# Patient Record
Sex: MALE | Race: AMERICAN INDIAN OR ALASKA NATIVE | NOT HISPANIC OR LATINO | Employment: FULL TIME | URBAN - METROPOLITAN AREA
[De-identification: names, ages, dates, MRNs, and addresses within clinical notes are randomized per-mention and may not be internally consistent; named-entity substitution may affect disease eponyms.]

---

## 2021-05-14 PROBLEM — K21.9 GERD (GASTROESOPHAGEAL REFLUX DISEASE): Status: ACTIVE | Noted: 2021-05-14

## 2021-08-13 DIAGNOSIS — J98.6 ELEVATED DIAPHRAGM: Primary | ICD-10-CM

## 2022-04-25 ENCOUNTER — TELEPHONE (OUTPATIENT)
Dept: CARDIOTHORACIC SURGERY | Facility: CLINIC | Age: 52
End: 2022-04-25
Payer: COMMERCIAL

## 2022-04-25 NOTE — PROGRESS NOTES
History & Physical    SUBJECTIVE:     History of Present Illness:  51 year old male with PMH of HTN, WM on CPAP and GERD presents for evaluation of elevated left alex-diaphragm. Reports dyspnea with little exertion that has been worked up by Pulmonology and Cardiology for about a year. He reports he notices dyspnea at night after he takes a shower, when lying flat and when walking.  He walks on his breaks at work and his coworker commented on how heavy he was breathing. COVID positive in April 2020. No history of cervical spine or cardiac surgeries or procedures. He does have diffuse spondylotic changes in his cervical spine resulting in multilevel mild spinal canal narrowing. Report intermittent left upper extremity neuropathy, numbness and tingling.     PSH of appendectomy, hemorrhoidectomy, tonsillectomy and adenoidectomy. No significant trauma history.  Never smoker. Denies EtOH. Works in an office.     Chief Complaint   Patient presents with    Consult       Review of patient's allergies indicates:  No Known Allergies    Current Outpatient Medications   Medication Sig Dispense Refill    lisinopriL-hydrochlorothiazide (PRINZIDE,ZESTORETIC) 20-12.5 mg per tablet Take 1 tablet by mouth once daily.      meloxicam (MOBIC) 15 MG tablet Take 15 mg by mouth once daily.      pantoprazole (PROTONIX) 40 MG tablet Take 40 mg by mouth 2 (two) times daily. Take prior to breakfast and supper.       No current facility-administered medications for this visit.     Facility-Administered Medications Ordered in Other Visits   Medication Dose Route Frequency Provider Last Rate Last Admin    0.9%  NaCl infusion   Intravenous Continuous Yuriy Wilburn MD   Stopped at 05/14/21 1047       Past Medical History:   Diagnosis Date    Hypertension     Internal hemorrhoids     Restless leg syndrome      Past Surgical History:   Procedure Laterality Date    APPENDECTOMY      ARTHROSCOPY OF KNEE Left      "ESOPHAGOGASTRODUODENOSCOPY N/A 5/14/2021    Procedure: EGD (ESOPHAGOGASTRODUODENOSCOPY);  Surgeon: Yuriy Wilburn MD;  Location: [facility];  Service: Endoscopy;  Laterality: N/A;    HEMORRHOID SURGERY      TONSILLECTOMY      addenoidectomy    TYMPANOSTOMY TUBE PLACEMENT       Family History   Problem Relation Age of Onset    Hypertension Father      Social History     Tobacco Use    Smoking status: Never Smoker    Smokeless tobacco: Never Used   Substance Use Topics    Alcohol use: Never    Drug use: Never        Review of Systems:  Review of Systems   Constitutional: Negative for appetite change, fatigue and fever.   HENT: Negative for trouble swallowing and voice change.    Respiratory: Positive for shortness of breath and wheezing. Negative for cough.    Gastrointestinal: Negative for abdominal pain, diarrhea and vomiting.   Musculoskeletal: Positive for arthralgias (LUE neuropathy) and neck pain. Negative for myalgias.   Neurological: Negative for weakness, light-headedness, numbness and headaches.   Psychiatric/Behavioral: Negative for confusion.       OBJECTIVE:     Vital Signs (Most Recent)  Pulse: (!) 57 (04/27/22 0851)  BP: 114/71 (04/27/22 0851)  SpO2: 96 % (04/27/22 0851)  6' 1" (1.854 m)  119.8 kg (264 lb 1.8 oz)   Body mass index is 34.85 kg/m².    Physical Exam:  Physical Exam  Constitutional:       Appearance: Normal appearance.   Cardiovascular:      Rate and Rhythm: Normal rate and regular rhythm.      Pulses: Normal pulses.   Pulmonary:      Effort: Pulmonary effort is normal.      Breath sounds: Wheezing (Expiratory wheeze) present.   Abdominal:      General: Abdomen is flat.      Palpations: Abdomen is soft.   Musculoskeletal:      Right lower leg: No edema.      Left lower leg: No edema.   Neurological:      General: No focal deficit present.      Mental Status: He is alert and oriented to person, place, and time.   Psychiatric:         Mood and Affect: Mood normal.       Diagnostic " Results:    6/25/21- CT Chest without Contrast:  The central airways are patent and clear.  Subsegmental atelectasis versus scarring in the bilateral lower lobes.  Mild chronic elevation bilateral hemidiaphragm.  No pleural or pericardial fluid.  No aortic aneurysm.  No hilar or mediastinal lymph node enlargement.  Visualized portion of the upper abdomen appears unremarkable.  No axillary lymph node enlargement.  No osseous abnormality.    6/25/21- PFTs:  FEV1- 2.7 63%  DLCO- 27 75%    7/15/21- 2D ECHO:      11/5/21- CT Cervical Spine:  1. Diffuse spondylotic change resulting in multilevel mild spinal canal narrowing and varying degrees of mild to severe foraminal narrowing with individual levels detailed above.  2. Reversal of the normal cervical curvature may be secondary to positioning or spasm    4/27/22- SNIFF: Elevated left hemidiaphragm with paradoxical upward motion, suggesting phrenic nerve palsy.    ASSESSMENT/PLAN:     51 year old male with PMH of HTN, WM on CPAP and GERD presents for evaluation of elevated left alex-diaphragm.    PLAN:Plan   Long discussion with patient regarding the indications, risks and benefits of left hemidiaphragm plication. He would like to proceed with left robot-assisted hemidiaphragm plication.   Appropriate patient education regarding the swapna-operative period as well as intraoperative details were discussed. Risks, including but not limited to, bleeding, infection, pain and anesthetic complication were discussed. Patient was given the opportunity to ask questions and to have those questions answered to their satisfaction. Patient verbalized understanding to both procedure and associated risks. Consent was obtained.

## 2022-04-25 NOTE — TELEPHONE ENCOUNTER
----- Message from Earnest Cruz sent at 4/25/2022  8:38 AM CDT -----  Regarding: sched an appt      The Pt's wife would like to be seen for the appt the Pt missed 9/10/2022 for - Diaphragm elevation.     # [phone] (Pt's wife states the Pt is at work and won't be able to recv your call)

## 2022-04-25 NOTE — TELEPHONE ENCOUNTER
Called and spoke to patient's wife. Imaging and appt rescheduled for this Wednesday 4/27. Given directions to radiology and clinic location. Patient to call back with any additional questions. Patient's wife verbalized understanding.

## 2022-04-27 ENCOUNTER — HOSPITAL ENCOUNTER (OUTPATIENT)
Dept: RADIOLOGY | Facility: HOSPITAL | Age: 52
Discharge: HOME | End: 2022-04-27
Attending: PHYSICIAN ASSISTANT
Payer: COMMERCIAL

## 2022-04-27 ENCOUNTER — OFFICE VISIT (OUTPATIENT)
Dept: CARDIOTHORACIC SURGERY | Facility: CLINIC | Age: 52
End: 2022-04-27
Payer: COMMERCIAL

## 2022-04-27 VITALS
OXYGEN SATURATION: 96 % | BODY MASS INDEX: 35.01 KG/M2 | HEART RATE: 57 BPM | DIASTOLIC BLOOD PRESSURE: 71 MMHG | WEIGHT: 264.13 LBS | SYSTOLIC BLOOD PRESSURE: 114 MMHG | HEIGHT: 73 IN

## 2022-04-27 DIAGNOSIS — J98.6 ELEVATED DIAPHRAGM: Primary | ICD-10-CM

## 2022-04-27 DIAGNOSIS — J98.6 ELEVATED DIAPHRAGM: ICD-10-CM

## 2022-04-27 DIAGNOSIS — J98.6 DIAPHRAGMATIC PARALYSIS: ICD-10-CM

## 2022-04-27 PROCEDURE — 76000 FLUOROSCOPY <1 HR PHYS/QHP: CPT | Mod: TC

## 2022-04-27 PROCEDURE — 76000 FL FLUORO OF DIAPHRAGM: ICD-10-PCS | Mod: 26,,, | Performed by: RADIOLOGY

## 2022-04-27 PROCEDURE — 3074F PR MOST RECENT SYSTOLIC BLOOD PRESSURE < 130 MM HG: ICD-10-PCS | Mod: CPTII,S$GLB,, | Performed by: THORACIC SURGERY (CARDIOTHORACIC VASCULAR SURGERY)

## 2022-04-27 PROCEDURE — 99205 PR OFFICE/OUTPT VISIT, NEW, LEVL V, 60-74 MIN: ICD-10-PCS | Mod: S$GLB,,, | Performed by: THORACIC SURGERY (CARDIOTHORACIC VASCULAR SURGERY)

## 2022-04-27 PROCEDURE — 3078F PR MOST RECENT DIASTOLIC BLOOD PRESSURE < 80 MM HG: ICD-10-PCS | Mod: CPTII,S$GLB,, | Performed by: THORACIC SURGERY (CARDIOTHORACIC VASCULAR SURGERY)

## 2022-04-27 PROCEDURE — 76000 FLUOROSCOPY <1 HR PHYS/QHP: CPT | Mod: 26,,, | Performed by: RADIOLOGY

## 2022-04-27 PROCEDURE — 3078F DIAST BP <80 MM HG: CPT | Mod: CPTII,S$GLB,, | Performed by: THORACIC SURGERY (CARDIOTHORACIC VASCULAR SURGERY)

## 2022-04-27 PROCEDURE — 99999 PR PBB SHADOW E&M-EST. PATIENT-LVL III: ICD-10-PCS | Mod: PBBFAC,,, | Performed by: THORACIC SURGERY (CARDIOTHORACIC VASCULAR SURGERY)

## 2022-04-27 PROCEDURE — 99999 PR PBB SHADOW E&M-EST. PATIENT-LVL III: CPT | Mod: PBBFAC,,, | Performed by: THORACIC SURGERY (CARDIOTHORACIC VASCULAR SURGERY)

## 2022-04-27 PROCEDURE — 3074F SYST BP LT 130 MM HG: CPT | Mod: CPTII,S$GLB,, | Performed by: THORACIC SURGERY (CARDIOTHORACIC VASCULAR SURGERY)

## 2022-04-27 PROCEDURE — 3008F PR BODY MASS INDEX (BMI) DOCUMENTED: ICD-10-PCS | Mod: CPTII,S$GLB,, | Performed by: THORACIC SURGERY (CARDIOTHORACIC VASCULAR SURGERY)

## 2022-04-27 PROCEDURE — 4010F ACE/ARB THERAPY RXD/TAKEN: CPT | Mod: CPTII,S$GLB,, | Performed by: THORACIC SURGERY (CARDIOTHORACIC VASCULAR SURGERY)

## 2022-04-27 PROCEDURE — 3008F BODY MASS INDEX DOCD: CPT | Mod: CPTII,S$GLB,, | Performed by: THORACIC SURGERY (CARDIOTHORACIC VASCULAR SURGERY)

## 2022-04-27 PROCEDURE — 4010F PR ACE/ARB THEARPY RXD/TAKEN: ICD-10-PCS | Mod: CPTII,S$GLB,, | Performed by: THORACIC SURGERY (CARDIOTHORACIC VASCULAR SURGERY)

## 2022-04-27 PROCEDURE — 99205 OFFICE O/P NEW HI 60 MIN: CPT | Mod: S$GLB,,, | Performed by: THORACIC SURGERY (CARDIOTHORACIC VASCULAR SURGERY)

## 2022-04-27 RX ORDER — LISINOPRIL AND HYDROCHLOROTHIAZIDE 12.5; 2 MG/1; MG/1
1 TABLET ORAL DAILY
COMMUNITY
Start: 2022-03-22 | End: 2024-02-23 | Stop reason: DRUGHIGH

## 2022-05-25 ENCOUNTER — TELEPHONE (OUTPATIENT)
Dept: CARDIOTHORACIC SURGERY | Facility: CLINIC | Age: 52
End: 2022-05-25
Payer: COMMERCIAL

## 2022-05-25 ENCOUNTER — PATIENT MESSAGE (OUTPATIENT)
Dept: CARDIOTHORACIC SURGERY | Facility: CLINIC | Age: 52
End: 2022-05-25
Payer: COMMERCIAL

## 2022-05-25 NOTE — TELEPHONE ENCOUNTER
----- Message from Matias Gaytan sent at 5/24/2022  9:22 AM CDT -----  Contact: patient  Patient calling in regards to canceling procedure. Requesting call back.      Patient @[phone]

## 2022-05-25 NOTE — TELEPHONE ENCOUNTER
Called patient again regarding canceling or rescheduling procedure. Patient voicemail is not set up and full on the alternate number. Unable to leave a message.

## 2022-05-25 NOTE — TELEPHONE ENCOUNTER
Called again this morning regarding message about canceling his procedure. Patient does not have voicemail set up and unable to leave a message.

## 2022-05-26 ENCOUNTER — TELEPHONE (OUTPATIENT)
Dept: CARDIOTHORACIC SURGERY | Facility: HOSPITAL | Age: 52
End: 2022-05-26
Payer: COMMERCIAL

## 2022-05-26 NOTE — TELEPHONE ENCOUNTER
Patient called to cancel his surgery. He has been having more issues with neck pain and upper extremity neuropathies related to cervical spine disease. He'd like to address this prior to having surgery on his diaphragm. He is scheduled to see a neurosurgeon in August. His breathing is about the same. He gets SOB with minimal exertion. I will cancel his surgery for June 13th. He will call to reschedule once his spine issues have been addressed. We can forgo another appointment and just schedule him for surgery.

## 2023-07-11 DIAGNOSIS — J98.6 ELEVATED DIAPHRAGM: Primary | ICD-10-CM

## 2023-07-21 ENCOUNTER — HOSPITAL ENCOUNTER (OUTPATIENT)
Dept: RADIOLOGY | Facility: HOSPITAL | Age: 53
Discharge: HOME | End: 2023-07-21
Attending: PHYSICIAN ASSISTANT
Payer: COMMERCIAL

## 2023-07-21 DIAGNOSIS — J98.6 ELEVATED DIAPHRAGM: ICD-10-CM

## 2023-07-21 PROCEDURE — 76000 FL FLUORO OF DIAPHRAGM: ICD-10-PCS | Mod: 26,,, | Performed by: RADIOLOGY

## 2023-07-21 PROCEDURE — 76000 FLUOROSCOPY <1 HR PHYS/QHP: CPT | Mod: 26,,, | Performed by: RADIOLOGY

## 2023-07-21 PROCEDURE — 76000 FLUOROSCOPY <1 HR PHYS/QHP: CPT | Mod: TC

## 2023-08-04 ENCOUNTER — OFFICE VISIT (OUTPATIENT)
Dept: CARDIOTHORACIC SURGERY | Facility: CLINIC | Age: 53
End: 2023-08-04
Payer: COMMERCIAL

## 2023-08-04 ENCOUNTER — LAB VISIT (OUTPATIENT)
Dept: LAB | Facility: HOSPITAL | Age: 53
End: 2023-08-04
Attending: PHYSICIAN ASSISTANT
Payer: COMMERCIAL

## 2023-08-04 VITALS
WEIGHT: 261.25 LBS | DIASTOLIC BLOOD PRESSURE: 90 MMHG | BODY MASS INDEX: 34.62 KG/M2 | SYSTOLIC BLOOD PRESSURE: 141 MMHG | HEIGHT: 73 IN | OXYGEN SATURATION: 97 % | HEART RATE: 61 BPM

## 2023-08-04 DIAGNOSIS — J98.6 ELEVATED DIAPHRAGM: ICD-10-CM

## 2023-08-04 DIAGNOSIS — Z01.810 PRE-OPERATIVE CARDIOVASCULAR EXAMINATION: ICD-10-CM

## 2023-08-04 DIAGNOSIS — J98.6 ELEVATED DIAPHRAGM: Primary | ICD-10-CM

## 2023-08-04 DIAGNOSIS — J98.6 DIAPHRAGMATIC PARALYSIS: ICD-10-CM

## 2023-08-04 LAB
ALBUMIN SERPL BCP-MCNC: 3.9 G/DL (ref 3.5–5.2)
ALP SERPL-CCNC: 98 U/L (ref 55–135)
ALT SERPL W/O P-5'-P-CCNC: 28 U/L (ref 10–44)
ANION GAP SERPL CALC-SCNC: 11 MMOL/L (ref 8–16)
APTT PPP: 26.5 SEC (ref 21–32)
AST SERPL-CCNC: 25 U/L (ref 10–40)
BASOPHILS # BLD AUTO: 0.07 K/UL (ref 0–0.2)
BASOPHILS NFR BLD: 1 % (ref 0–1.9)
BILIRUB SERPL-MCNC: 0.5 MG/DL (ref 0.1–1)
BUN SERPL-MCNC: 8 MG/DL (ref 6–20)
CALCIUM SERPL-MCNC: 8.9 MG/DL (ref 8.7–10.5)
CHLORIDE SERPL-SCNC: 111 MMOL/L (ref 95–110)
CO2 SERPL-SCNC: 20 MMOL/L (ref 23–29)
CREAT SERPL-MCNC: 1 MG/DL (ref 0.5–1.4)
DIFFERENTIAL METHOD: ABNORMAL
EOSINOPHIL # BLD AUTO: 0.2 K/UL (ref 0–0.5)
EOSINOPHIL NFR BLD: 2.1 % (ref 0–8)
ERYTHROCYTE [DISTWIDTH] IN BLOOD BY AUTOMATED COUNT: 13.4 % (ref 11.5–14.5)
EST. GFR  (NO RACE VARIABLE): >60 ML/MIN/1.73 M^2
GLUCOSE SERPL-MCNC: 95 MG/DL (ref 70–110)
HCT VFR BLD AUTO: 45 % (ref 40–54)
HGB BLD-MCNC: 14.1 G/DL (ref 14–18)
IMM GRANULOCYTES # BLD AUTO: 0.03 K/UL (ref 0–0.04)
IMM GRANULOCYTES NFR BLD AUTO: 0.4 % (ref 0–0.5)
INR PPP: 1 (ref 0.8–1.2)
LYMPHOCYTES # BLD AUTO: 2.3 K/UL (ref 1–4.8)
LYMPHOCYTES NFR BLD: 32.5 % (ref 18–48)
MCH RBC QN AUTO: 25.3 PG (ref 27–31)
MCHC RBC AUTO-ENTMCNC: 31.3 G/DL (ref 32–36)
MCV RBC AUTO: 81 FL (ref 82–98)
MONOCYTES # BLD AUTO: 0.8 K/UL (ref 0.3–1)
MONOCYTES NFR BLD: 10.5 % (ref 4–15)
NEUTROPHILS # BLD AUTO: 3.8 K/UL (ref 1.8–7.7)
NEUTROPHILS NFR BLD: 53.5 % (ref 38–73)
NRBC BLD-RTO: 0 /100 WBC
PLATELET # BLD AUTO: 269 K/UL (ref 150–450)
PMV BLD AUTO: 11.1 FL (ref 9.2–12.9)
POTASSIUM SERPL-SCNC: 4.2 MMOL/L (ref 3.5–5.1)
PREALB SERPL-MCNC: 19 MG/DL (ref 20–43)
PROT SERPL-MCNC: 6.8 G/DL (ref 6–8.4)
PROTHROMBIN TIME: 10.5 SEC (ref 9–12.5)
RBC # BLD AUTO: 5.57 M/UL (ref 4.6–6.2)
SODIUM SERPL-SCNC: 142 MMOL/L (ref 136–145)
WBC # BLD AUTO: 7.16 K/UL (ref 3.9–12.7)

## 2023-08-04 PROCEDURE — 3080F DIAST BP >= 90 MM HG: CPT | Mod: CPTII,S$GLB,, | Performed by: THORACIC SURGERY (CARDIOTHORACIC VASCULAR SURGERY)

## 2023-08-04 PROCEDURE — 3080F PR MOST RECENT DIASTOLIC BLOOD PRESSURE >= 90 MM HG: ICD-10-PCS | Mod: CPTII,S$GLB,, | Performed by: THORACIC SURGERY (CARDIOTHORACIC VASCULAR SURGERY)

## 2023-08-04 PROCEDURE — 80053 COMPREHEN METABOLIC PANEL: CPT | Performed by: PHYSICIAN ASSISTANT

## 2023-08-04 PROCEDURE — 85610 PROTHROMBIN TIME: CPT | Performed by: PHYSICIAN ASSISTANT

## 2023-08-04 PROCEDURE — 99999 PR PBB SHADOW E&M-EST. PATIENT-LVL IV: CPT | Mod: PBBFAC,,, | Performed by: THORACIC SURGERY (CARDIOTHORACIC VASCULAR SURGERY)

## 2023-08-04 PROCEDURE — 4010F PR ACE/ARB THEARPY RXD/TAKEN: ICD-10-PCS | Mod: CPTII,S$GLB,, | Performed by: THORACIC SURGERY (CARDIOTHORACIC VASCULAR SURGERY)

## 2023-08-04 PROCEDURE — 3008F PR BODY MASS INDEX (BMI) DOCUMENTED: ICD-10-PCS | Mod: CPTII,S$GLB,, | Performed by: THORACIC SURGERY (CARDIOTHORACIC VASCULAR SURGERY)

## 2023-08-04 PROCEDURE — 1159F MED LIST DOCD IN RCRD: CPT | Mod: CPTII,S$GLB,, | Performed by: THORACIC SURGERY (CARDIOTHORACIC VASCULAR SURGERY)

## 2023-08-04 PROCEDURE — 1159F PR MEDICATION LIST DOCUMENTED IN MEDICAL RECORD: ICD-10-PCS | Mod: CPTII,S$GLB,, | Performed by: THORACIC SURGERY (CARDIOTHORACIC VASCULAR SURGERY)

## 2023-08-04 PROCEDURE — 3008F BODY MASS INDEX DOCD: CPT | Mod: CPTII,S$GLB,, | Performed by: THORACIC SURGERY (CARDIOTHORACIC VASCULAR SURGERY)

## 2023-08-04 PROCEDURE — 3077F SYST BP >= 140 MM HG: CPT | Mod: CPTII,S$GLB,, | Performed by: THORACIC SURGERY (CARDIOTHORACIC VASCULAR SURGERY)

## 2023-08-04 PROCEDURE — 3077F PR MOST RECENT SYSTOLIC BLOOD PRESSURE >= 140 MM HG: ICD-10-PCS | Mod: CPTII,S$GLB,, | Performed by: THORACIC SURGERY (CARDIOTHORACIC VASCULAR SURGERY)

## 2023-08-04 PROCEDURE — 85730 THROMBOPLASTIN TIME PARTIAL: CPT | Performed by: PHYSICIAN ASSISTANT

## 2023-08-04 PROCEDURE — 99213 OFFICE O/P EST LOW 20 MIN: CPT | Mod: S$GLB,,, | Performed by: THORACIC SURGERY (CARDIOTHORACIC VASCULAR SURGERY)

## 2023-08-04 PROCEDURE — 99999 PR PBB SHADOW E&M-EST. PATIENT-LVL IV: ICD-10-PCS | Mod: PBBFAC,,, | Performed by: THORACIC SURGERY (CARDIOTHORACIC VASCULAR SURGERY)

## 2023-08-04 PROCEDURE — 36415 COLL VENOUS BLD VENIPUNCTURE: CPT | Performed by: PHYSICIAN ASSISTANT

## 2023-08-04 PROCEDURE — 4010F ACE/ARB THERAPY RXD/TAKEN: CPT | Mod: CPTII,S$GLB,, | Performed by: THORACIC SURGERY (CARDIOTHORACIC VASCULAR SURGERY)

## 2023-08-04 PROCEDURE — 85025 COMPLETE CBC W/AUTO DIFF WBC: CPT | Performed by: PHYSICIAN ASSISTANT

## 2023-08-04 PROCEDURE — 99213 PR OFFICE/OUTPT VISIT, EST, LEVL III, 20-29 MIN: ICD-10-PCS | Mod: S$GLB,,, | Performed by: THORACIC SURGERY (CARDIOTHORACIC VASCULAR SURGERY)

## 2023-08-04 PROCEDURE — 84134 ASSAY OF PREALBUMIN: CPT | Performed by: PHYSICIAN ASSISTANT

## 2023-09-01 ENCOUNTER — HOSPITAL ENCOUNTER (OUTPATIENT)
Dept: CARDIOLOGY | Facility: HOSPITAL | Age: 53
Discharge: HOME | End: 2023-09-01
Attending: THORACIC SURGERY (CARDIOTHORACIC VASCULAR SURGERY)
Payer: COMMERCIAL

## 2023-09-01 VITALS
BODY MASS INDEX: 34.59 KG/M2 | HEART RATE: 70 BPM | WEIGHT: 261 LBS | HEIGHT: 73 IN | DIASTOLIC BLOOD PRESSURE: 90 MMHG | SYSTOLIC BLOOD PRESSURE: 140 MMHG

## 2023-09-01 DIAGNOSIS — Z01.810 PRE-OPERATIVE CARDIOVASCULAR EXAMINATION: ICD-10-CM

## 2023-09-01 LAB
AV INDEX (PROSTH): 0.84
AV MEAN GRADIENT: 6 MMHG
AV PEAK GRADIENT: 12 MMHG
AV VALVE AREA BY VELOCITY RATIO: 2.36 CM²
AV VALVE AREA: 2.6 CM²
AV VELOCITY RATIO: 0.77
BSA FOR ECHO PROCEDURE: 2.47 M2
CV ECHO LV RWT: 0.3 CM
DOP CALC AO PEAK VEL: 1.76 M/S
DOP CALC AO VTI: 36.88 CM
DOP CALC LVOT AREA: 3.1 CM2
DOP CALC LVOT DIAMETER: 1.98 CM
DOP CALC LVOT PEAK VEL: 1.35 M/S
DOP CALC LVOT STROKE VOLUME: 95.9 CM3
DOP CALCLVOT PEAK VEL VTI: 31.16 CM
E WAVE DECELERATION TIME: 201.02 MSEC
E/A RATIO: 1.29
E/E' RATIO: 7.5 M/S
ECHO LV POSTERIOR WALL: 0.8 CM (ref 0.6–1.1)
FRACTIONAL SHORTENING: 30 % (ref 28–44)
INTERVENTRICULAR SEPTUM: 0.79 CM (ref 0.6–1.1)
IVRT: 79.92 MSEC
LA MAJOR: 5.04 CM
LA MINOR: 5.01 CM
LA WIDTH: 4.14 CM
LEFT ATRIUM SIZE: 4.04 CM
LEFT ATRIUM VOLUME INDEX MOD: 28 ML/M2
LEFT ATRIUM VOLUME INDEX: 29.6 ML/M2
LEFT ATRIUM VOLUME MOD: 67.58 CM3
LEFT ATRIUM VOLUME: 71.44 CM3
LEFT INTERNAL DIMENSION IN SYSTOLE: 3.68 CM (ref 2.1–4)
LEFT VENTRICLE DIASTOLIC VOLUME INDEX: 55.15 ML/M2
LEFT VENTRICLE DIASTOLIC VOLUME: 132.91 ML
LEFT VENTRICLE MASS INDEX: 61 G/M2
LEFT VENTRICLE SYSTOLIC VOLUME INDEX: 23.9 ML/M2
LEFT VENTRICLE SYSTOLIC VOLUME: 57.49 ML
LEFT VENTRICULAR INTERNAL DIMENSION IN DIASTOLE: 5.26 CM (ref 3.5–6)
LEFT VENTRICULAR MASS: 146.94 G
LV LATERAL E/E' RATIO: 7.5 M/S
LV SEPTAL E/E' RATIO: 7.5 M/S
MV A" WAVE DURATION": 18.27 MSEC
MV PEAK A VEL: 0.7 M/S
MV PEAK E VEL: 0.9 M/S
MV STENOSIS PRESSURE HALF TIME: 58.3 MS
MV VALVE AREA P 1/2 METHOD: 3.77 CM2
PISA TR MAX VEL: 2.15 M/S
PULM VEIN S/D RATIO: 0.4
PV PEAK D VEL: 0.48 M/S
PV PEAK S VEL: 0.19 M/S
RA MAJOR: 4.34 CM
RA PRESSURE ESTIMATED: 3 MMHG
RA WIDTH: 3.8 CM
RIGHT VENTRICULAR END-DIASTOLIC DIMENSION: 4 CM
RV TB RVSP: 5 MMHG
SINUS: 3.3 CM
STJ: 2.6 CM
TDI LATERAL: 0.12 M/S
TDI SEPTAL: 0.12 M/S
TDI: 0.12 M/S
TR MAX PG: 18 MMHG
TRICUSPID ANNULAR PLANE SYSTOLIC EXCURSION: 2.89 CM
TV REST PULMONARY ARTERY PRESSURE: 21 MMHG
Z-SCORE OF LEFT VENTRICULAR DIMENSION IN END DIASTOLE: -7.54
Z-SCORE OF LEFT VENTRICULAR DIMENSION IN END SYSTOLE: -4.72

## 2023-09-01 PROCEDURE — 93306 TTE W/DOPPLER COMPLETE: CPT | Mod: 26,,, | Performed by: INTERNAL MEDICINE

## 2023-09-01 PROCEDURE — 93306 TTE W/DOPPLER COMPLETE: CPT

## 2023-09-01 PROCEDURE — 93306 ECHO (CUPID ONLY): ICD-10-PCS | Mod: 26,,, | Performed by: INTERNAL MEDICINE

## 2023-09-13 ENCOUNTER — ANESTHESIA EVENT (OUTPATIENT)
Dept: SURGERY | Facility: HOSPITAL | Age: 53
DRG: 165 | End: 2023-09-13
Payer: COMMERCIAL

## 2023-09-13 ENCOUNTER — TELEPHONE (OUTPATIENT)
Dept: CARDIOTHORACIC SURGERY | Facility: CLINIC | Age: 53
End: 2023-09-13
Payer: COMMERCIAL

## 2023-09-13 NOTE — ANESTHESIA PREPROCEDURE EVALUATION
[facility]  Anesthesia Pre-Operative Evaluation         Patient Name: Michele Chi Jr.  YOB: 1970  MRN: [id number]    SUBJECTIVE:     Pre-operative evaluation for Procedure(s) (LRB):  XI ROBOTIC PLICATION, DIAPHRAGM (Left)     09/13/2023    Michele Chi Jr. is a 53 y.o. male w/ a significant PMHx of HTN, WM on CPAP, GERD, and c4-c7 fusion. Presented with elevated left hemidiaphragm. He complains of CHAVEZ and orthopnea.     Patient now presents for the above procedure(s).      LDA: None documented.     Prev airway: None documented.    Drips: None documented.    Patient Active Problem List   Diagnosis    GERD (gastroesophageal reflux disease)       Review of patient's allergies indicates:  No Known Allergies    Current Inpatient Medications:      Current Facility-Administered Medications on File Prior to Encounter   Medication Dose Route Frequency Provider Last Rate Last Admin    0.9%  NaCl infusion   Intravenous Continuous Yuriy Wilburn MD   Stopped at 05/14/21 1047     Current Outpatient Medications on File Prior to Encounter   Medication Sig Dispense Refill    lisinopriL-hydrochlorothiazide (PRINZIDE,ZESTORETIC) 20-12.5 mg per tablet Take 1 tablet by mouth once daily.      pantoprazole (PROTONIX) 40 MG tablet Take 40 mg by mouth daily as needed. Take prior to breakfast and supper.         Past Surgical History:   Procedure Laterality Date    APPENDECTOMY      ARTHROSCOPY OF KNEE Left     CERVICAL FUSION      COLONOSCOPY N/A 8/30/2023    Procedure: COLONOSCOPY;  Surgeon: Yuriy Wilburn MD;  Location: [facility];  Service: Endoscopy;  Laterality: N/A;    ESOPHAGOGASTRODUODENOSCOPY N/A 05/14/2021    Procedure: EGD (ESOPHAGOGASTRODUODENOSCOPY);  Surgeon: Yuriy Wilburn MD;  Location: [facility];  Service: Endoscopy;  Laterality: N/A;    HEMORRHOID SURGERY      TONSILLECTOMY      addenoidectomy    TYMPANOSTOMY TUBE PLACEMENT         Social  History     Socioeconomic History    Marital status:    Tobacco Use    Smoking status: Never    Smokeless tobacco: Never   Substance and Sexual Activity    Alcohol use: Never    Drug use: Never    Sexual activity: Yes       OBJECTIVE:     Vital Signs Range (Last 24H):         Significant Labs:  Lab Results   Component Value Date    WBC 7.16 08/04/2023    HGB 14.1 08/04/2023    HCT 45.0 08/04/2023     08/04/2023    ALT 28 08/04/2023    AST 25 08/04/2023     08/04/2023    K 4.2 08/04/2023     (H) 08/04/2023    CREATININE 1.0 08/04/2023    BUN 8 08/04/2023    CO2 20 (L) 08/04/2023    INR 1.0 08/04/2023       Diagnostic Studies: No relevant studies.    EKG:   Results for orders placed or performed during the hospital encounter of 09/23/22   EKG 12-lead    Collection Time: 09/23/22 11:21 AM    Narrative    Test Reason : Z01.810,    Vent. Rate : 072 BPM     Atrial Rate : 072 BPM     P-R Int : 168 ms          QRS Dur : 084 ms      QT Int : 384 ms       P-R-T Axes : 062 061 068 degrees     QTc Int : 420 ms    Normal sinus rhythm  Normal ECG  When compared with ECG of 08-MAY-2021 19:52,  No significant change was found  Confirmed by Damon Ford MD ([id number]) on 9/23/2022 4:41:15 PM    Referred By: MARY MAURICIO           Confirmed By:Damon Ford MD       2D ECHO:  TTE:  No results found for this or any previous visit.    ELAINA:  No results found for this or any previous visit.    ASSESSMENT/PLAN:           Pre-op Assessment    I have reviewed the Patient Summary Reports.     I have reviewed the Nursing Notes. I have reviewed the NPO Status.   I have reviewed the Medications.     Review of Systems  Anesthesia Hx:  No problems with previous Anesthesia  History of prior surgery of interest to airway management or planning: Denies Family Hx of Anesthesia complications.   Denies Personal Hx of Anesthesia complications.   Social:  Non-Smoker, No Alcohol Use    Hematology/Oncology:  Hematology Normal    Oncology Normal     EENT/Dental:EENT/Dental Normal   Cardiovascular:   Exercise tolerance: good Hypertension Denies CAD.    Denies Dysrhythmias.     Pulmonary:   Denies COPD. Sleep Apnea, CPAP    Hepatic/GI:   GERD    Musculoskeletal:  Spine Disorders: cervical    Neurological:   Denies Neuromuscular Disease.    Endocrine:   Denies Diabetes.    Psych:   Denies Psychiatric History.          Physical Exam  General: Well nourished, Cooperative, Alert and Oriented    Airway:  Mallampati: III   Mouth Opening: Normal  TM Distance: Normal  Tongue: Normal  Neck ROM: Normal ROM    Dental:  Intact    Chest/Lungs:  Clear to auscultation, Normal Respiratory Rate    Heart:  Rate: Normal  Rhythm: Regular Rhythm  Sounds: Normal    Abdomen:  Nontender, Soft, Normal        Anesthesia Plan  Type of Anesthesia, risks & benefits discussed:    Anesthesia Type: Gen ETT  Intra-op Monitoring Plan: Standard ASA Monitors and Art Line  Post Op Pain Control Plan: multimodal analgesia  Induction:  IV  Airway Plan: Video and Fiberoptic, Post-Induction  Informed Consent: Informed consent signed with the Patient and all parties understand the risks and agree with anesthesia plan.  All questions answered.   ASA Score: 3  Day of Surgery Review of History & Physical: H&P Update referred to the surgeon/provider.    Ready For Surgery From Anesthesia Perspective.     .

## 2023-09-14 ENCOUNTER — ANESTHESIA (OUTPATIENT)
Dept: SURGERY | Facility: HOSPITAL | Age: 53
DRG: 165 | End: 2023-09-14
Payer: COMMERCIAL

## 2023-09-14 ENCOUNTER — HOSPITAL ENCOUNTER (INPATIENT)
Facility: HOSPITAL | Age: 53
LOS: 2 days | Discharge: HOME | DRG: 165 | End: 2023-09-16
Attending: THORACIC SURGERY (CARDIOTHORACIC VASCULAR SURGERY) | Admitting: THORACIC SURGERY (CARDIOTHORACIC VASCULAR SURGERY)
Payer: COMMERCIAL

## 2023-09-14 DIAGNOSIS — J98.6 ELEVATED DIAPHRAGM: Primary | ICD-10-CM

## 2023-09-14 DIAGNOSIS — K21.9 GASTROESOPHAGEAL REFLUX DISEASE, UNSPECIFIED WHETHER ESOPHAGITIS PRESENT: ICD-10-CM

## 2023-09-14 LAB
ABO + RH BLD: NORMAL
BASOPHILS # BLD AUTO: 0.02 K/UL (ref 0–0.2)
BASOPHILS NFR BLD: 0.2 % (ref 0–1.9)
BLD GP AB SCN CELLS X3 SERPL QL: NORMAL
CREAT SERPL-MCNC: 1.2 MG/DL (ref 0.5–1.4)
DIFFERENTIAL METHOD: ABNORMAL
EOSINOPHIL # BLD AUTO: 0 K/UL (ref 0–0.5)
EOSINOPHIL NFR BLD: 0.2 % (ref 0–8)
ERYTHROCYTE [DISTWIDTH] IN BLOOD BY AUTOMATED COUNT: 14.1 % (ref 11.5–14.5)
EST. GFR  (NO RACE VARIABLE): >60 ML/MIN/1.73 M^2
HCT VFR BLD AUTO: 41.7 % (ref 40–54)
HGB BLD-MCNC: 14.1 G/DL (ref 14–18)
IMM GRANULOCYTES # BLD AUTO: 0.04 K/UL (ref 0–0.04)
IMM GRANULOCYTES NFR BLD AUTO: 0.3 % (ref 0–0.5)
LYMPHOCYTES # BLD AUTO: 1 K/UL (ref 1–4.8)
LYMPHOCYTES NFR BLD: 8.2 % (ref 18–48)
MCH RBC QN AUTO: 26.6 PG (ref 27–31)
MCHC RBC AUTO-ENTMCNC: 33.8 G/DL (ref 32–36)
MCV RBC AUTO: 79 FL (ref 82–98)
MONOCYTES # BLD AUTO: 0.3 K/UL (ref 0.3–1)
MONOCYTES NFR BLD: 2 % (ref 4–15)
NEUTROPHILS # BLD AUTO: 11 K/UL (ref 1.8–7.7)
NEUTROPHILS NFR BLD: 89.1 % (ref 38–73)
NRBC BLD-RTO: 0 /100 WBC
PLATELET # BLD AUTO: 202 K/UL (ref 150–450)
PMV BLD AUTO: 11 FL (ref 9.2–12.9)
RBC # BLD AUTO: 5.31 M/UL (ref 4.6–6.2)
SPECIMEN OUTDATE: NORMAL
WBC # BLD AUTO: 12.32 K/UL (ref 3.9–12.7)

## 2023-09-14 PROCEDURE — 71000015 HC POSTOP RECOV 1ST HR: Performed by: THORACIC SURGERY (CARDIOTHORACIC VASCULAR SURGERY)

## 2023-09-14 PROCEDURE — 85025 COMPLETE CBC W/AUTO DIFF WBC: CPT | Performed by: THORACIC SURGERY (CARDIOTHORACIC VASCULAR SURGERY)

## 2023-09-14 PROCEDURE — 27201423 OPTIME MED/SURG SUP & DEVICES STERILE SUPPLY: Performed by: THORACIC SURGERY (CARDIOTHORACIC VASCULAR SURGERY)

## 2023-09-14 PROCEDURE — 25000003 PHARM REV CODE 250: Performed by: STUDENT IN AN ORGANIZED HEALTH CARE EDUCATION/TRAINING PROGRAM

## 2023-09-14 PROCEDURE — 82565 ASSAY OF CREATININE: CPT | Performed by: THORACIC SURGERY (CARDIOTHORACIC VASCULAR SURGERY)

## 2023-09-14 PROCEDURE — 63600175 PHARM REV CODE 636 W HCPCS: Performed by: STUDENT IN AN ORGANIZED HEALTH CARE EDUCATION/TRAINING PROGRAM

## 2023-09-14 PROCEDURE — 86900 BLOOD TYPING SEROLOGIC ABO: CPT | Performed by: PHYSICIAN ASSISTANT

## 2023-09-14 PROCEDURE — 32999 PR THORACOSCOPIC PLICATION OF DIAPHRAM, W/ OR W/O RESECTION: ICD-10-PCS | Mod: ,,, | Performed by: THORACIC SURGERY (CARDIOTHORACIC VASCULAR SURGERY)

## 2023-09-14 PROCEDURE — 36415 COLL VENOUS BLD VENIPUNCTURE: CPT | Performed by: PHYSICIAN ASSISTANT

## 2023-09-14 PROCEDURE — D9220A PRA ANESTHESIA: Mod: ,,, | Performed by: ANESTHESIOLOGY

## 2023-09-14 PROCEDURE — 71000039 HC RECOVERY, EACH ADD'L HOUR: Performed by: THORACIC SURGERY (CARDIOTHORACIC VASCULAR SURGERY)

## 2023-09-14 PROCEDURE — 32999 UNLISTED PX LUNGS & PLEURA: CPT | Mod: ,,, | Performed by: THORACIC SURGERY (CARDIOTHORACIC VASCULAR SURGERY)

## 2023-09-14 PROCEDURE — 36620 ARTERIAL: ICD-10-PCS | Mod: 59,,, | Performed by: ANESTHESIOLOGY

## 2023-09-14 PROCEDURE — 37000008 HC ANESTHESIA 1ST 15 MINUTES: Performed by: THORACIC SURGERY (CARDIOTHORACIC VASCULAR SURGERY)

## 2023-09-14 PROCEDURE — D9220A PRA ANESTHESIA: ICD-10-PCS | Mod: ,,, | Performed by: ANESTHESIOLOGY

## 2023-09-14 PROCEDURE — 37000009 HC ANESTHESIA EA ADD 15 MINS: Performed by: THORACIC SURGERY (CARDIOTHORACIC VASCULAR SURGERY)

## 2023-09-14 PROCEDURE — 36000713 HC OR TIME LEV V EA ADD 15 MIN: Performed by: THORACIC SURGERY (CARDIOTHORACIC VASCULAR SURGERY)

## 2023-09-14 PROCEDURE — 25000003 PHARM REV CODE 250: Performed by: PHYSICIAN ASSISTANT

## 2023-09-14 PROCEDURE — C9290 INJ, BUPIVACAINE LIPOSOME: HCPCS | Performed by: THORACIC SURGERY (CARDIOTHORACIC VASCULAR SURGERY)

## 2023-09-14 PROCEDURE — 71000016 HC POSTOP RECOV ADDL HR: Performed by: THORACIC SURGERY (CARDIOTHORACIC VASCULAR SURGERY)

## 2023-09-14 PROCEDURE — 63600175 PHARM REV CODE 636 W HCPCS: Performed by: THORACIC SURGERY (CARDIOTHORACIC VASCULAR SURGERY)

## 2023-09-14 PROCEDURE — 20600001 HC STEP DOWN PRIVATE ROOM

## 2023-09-14 PROCEDURE — 71000033 HC RECOVERY, INTIAL HOUR: Performed by: THORACIC SURGERY (CARDIOTHORACIC VASCULAR SURGERY)

## 2023-09-14 PROCEDURE — 63600175 PHARM REV CODE 636 W HCPCS

## 2023-09-14 PROCEDURE — 94761 N-INVAS EAR/PLS OXIMETRY MLT: CPT

## 2023-09-14 PROCEDURE — 36000712 HC OR TIME LEV V 1ST 15 MIN: Performed by: THORACIC SURGERY (CARDIOTHORACIC VASCULAR SURGERY)

## 2023-09-14 PROCEDURE — 36620 INSERTION CATHETER ARTERY: CPT | Mod: 59,,, | Performed by: ANESTHESIOLOGY

## 2023-09-14 RX ORDER — HYDROMORPHONE HYDROCHLORIDE 1 MG/ML
0.5 INJECTION, SOLUTION INTRAMUSCULAR; INTRAVENOUS; SUBCUTANEOUS ONCE
Status: COMPLETED | OUTPATIENT
Start: 2023-09-14 | End: 2023-09-14

## 2023-09-14 RX ORDER — GABAPENTIN 300 MG/1
300 CAPSULE ORAL 3 TIMES DAILY
Status: DISCONTINUED | OUTPATIENT
Start: 2023-09-14 | End: 2023-09-15

## 2023-09-14 RX ORDER — SODIUM CHLORIDE 0.9 % (FLUSH) 0.9 %
10 SYRINGE (ML) INJECTION
Status: DISCONTINUED | OUTPATIENT
Start: 2023-09-14 | End: 2023-09-16 | Stop reason: HOSPADM

## 2023-09-14 RX ORDER — HYDROMORPHONE HYDROCHLORIDE 1 MG/ML
INJECTION, SOLUTION INTRAMUSCULAR; INTRAVENOUS; SUBCUTANEOUS
Status: COMPLETED
Start: 2023-09-14 | End: 2023-09-14

## 2023-09-14 RX ORDER — KETOROLAC TROMETHAMINE 30 MG/ML
INJECTION, SOLUTION INTRAMUSCULAR; INTRAVENOUS
Status: DISCONTINUED | OUTPATIENT
Start: 2023-09-14 | End: 2023-09-14

## 2023-09-14 RX ORDER — CEFAZOLIN SODIUM 1 G/3ML
INJECTION, POWDER, FOR SOLUTION INTRAMUSCULAR; INTRAVENOUS
Status: DISCONTINUED | OUTPATIENT
Start: 2023-09-14 | End: 2023-09-14

## 2023-09-14 RX ORDER — DEXMEDETOMIDINE HYDROCHLORIDE 100 UG/ML
INJECTION, SOLUTION INTRAVENOUS
Status: DISCONTINUED | OUTPATIENT
Start: 2023-09-14 | End: 2023-09-14

## 2023-09-14 RX ORDER — ONDANSETRON 2 MG/ML
INJECTION INTRAMUSCULAR; INTRAVENOUS
Status: DISCONTINUED | OUTPATIENT
Start: 2023-09-14 | End: 2023-09-14

## 2023-09-14 RX ORDER — ENOXAPARIN SODIUM 100 MG/ML
40 INJECTION SUBCUTANEOUS EVERY 24 HOURS
Status: DISCONTINUED | OUTPATIENT
Start: 2023-09-14 | End: 2023-09-16 | Stop reason: HOSPADM

## 2023-09-14 RX ORDER — OXYCODONE HYDROCHLORIDE 10 MG/1
10 TABLET ORAL EVERY 6 HOURS PRN
Status: DISCONTINUED | OUTPATIENT
Start: 2023-09-14 | End: 2023-09-15

## 2023-09-14 RX ORDER — LIDOCAINE HYDROCHLORIDE 20 MG/ML
INJECTION, SOLUTION EPIDURAL; INFILTRATION; INTRACAUDAL; PERINEURAL
Status: DISCONTINUED | OUTPATIENT
Start: 2023-09-14 | End: 2023-09-14

## 2023-09-14 RX ORDER — TALC
6 POWDER (GRAM) TOPICAL NIGHTLY PRN
Status: DISCONTINUED | OUTPATIENT
Start: 2023-09-14 | End: 2023-09-16 | Stop reason: HOSPADM

## 2023-09-14 RX ORDER — PROCHLORPERAZINE EDISYLATE 5 MG/ML
5 INJECTION INTRAMUSCULAR; INTRAVENOUS EVERY 6 HOURS PRN
Status: DISCONTINUED | OUTPATIENT
Start: 2023-09-14 | End: 2023-09-16 | Stop reason: HOSPADM

## 2023-09-14 RX ORDER — KETAMINE HCL IN 0.9 % NACL 50 MG/5 ML
SYRINGE (ML) INTRAVENOUS
Status: DISCONTINUED | OUTPATIENT
Start: 2023-09-14 | End: 2023-09-14

## 2023-09-14 RX ORDER — PROPOFOL 10 MG/ML
VIAL (ML) INTRAVENOUS
Status: DISCONTINUED | OUTPATIENT
Start: 2023-09-14 | End: 2023-09-14

## 2023-09-14 RX ORDER — MIDAZOLAM HYDROCHLORIDE 1 MG/ML
INJECTION, SOLUTION INTRAMUSCULAR; INTRAVENOUS
Status: DISCONTINUED | OUTPATIENT
Start: 2023-09-14 | End: 2023-09-14

## 2023-09-14 RX ORDER — ONDANSETRON 2 MG/ML
8 INJECTION INTRAMUSCULAR; INTRAVENOUS EVERY 6 HOURS PRN
Status: DISCONTINUED | OUTPATIENT
Start: 2023-09-14 | End: 2023-09-16 | Stop reason: HOSPADM

## 2023-09-14 RX ORDER — ACETAMINOPHEN 500 MG
1000 TABLET ORAL
Status: DISCONTINUED | OUTPATIENT
Start: 2023-09-14 | End: 2023-09-14 | Stop reason: HOSPADM

## 2023-09-14 RX ORDER — OXYCODONE HYDROCHLORIDE 5 MG/1
5 TABLET ORAL EVERY 4 HOURS PRN
Status: DISCONTINUED | OUTPATIENT
Start: 2023-09-14 | End: 2023-09-16 | Stop reason: HOSPADM

## 2023-09-14 RX ORDER — ACETAMINOPHEN 500 MG
1000 TABLET ORAL EVERY 8 HOURS
Status: DISCONTINUED | OUTPATIENT
Start: 2023-09-14 | End: 2023-09-16 | Stop reason: HOSPADM

## 2023-09-14 RX ORDER — POLYETHYLENE GLYCOL 3350 17 G/17G
17 POWDER, FOR SOLUTION ORAL 2 TIMES DAILY
Status: DISCONTINUED | OUTPATIENT
Start: 2023-09-14 | End: 2023-09-16 | Stop reason: HOSPADM

## 2023-09-14 RX ORDER — PANTOPRAZOLE SODIUM 40 MG/1
40 TABLET, DELAYED RELEASE ORAL DAILY
Status: DISCONTINUED | OUTPATIENT
Start: 2023-09-14 | End: 2023-09-16 | Stop reason: HOSPADM

## 2023-09-14 RX ORDER — PROCHLORPERAZINE EDISYLATE 5 MG/ML
5 INJECTION INTRAMUSCULAR; INTRAVENOUS EVERY 30 MIN PRN
Status: DISCONTINUED | OUTPATIENT
Start: 2023-09-14 | End: 2023-09-14 | Stop reason: HOSPADM

## 2023-09-14 RX ORDER — PHENYLEPHRINE HCL IN 0.9% NACL 1 MG/10 ML
SYRINGE (ML) INTRAVENOUS
Status: DISCONTINUED | OUTPATIENT
Start: 2023-09-14 | End: 2023-09-14

## 2023-09-14 RX ORDER — METHOCARBAMOL 500 MG/1
500 TABLET, FILM COATED ORAL 4 TIMES DAILY
Status: DISCONTINUED | OUTPATIENT
Start: 2023-09-14 | End: 2023-09-15

## 2023-09-14 RX ORDER — ACETAMINOPHEN 500 MG
1000 TABLET ORAL
Status: COMPLETED | OUTPATIENT
Start: 2023-09-14 | End: 2023-09-14

## 2023-09-14 RX ORDER — ACETAMINOPHEN 325 MG/1
650 TABLET ORAL EVERY 8 HOURS PRN
Status: DISCONTINUED | OUTPATIENT
Start: 2023-09-14 | End: 2023-09-16 | Stop reason: HOSPADM

## 2023-09-14 RX ORDER — ROCURONIUM BROMIDE 10 MG/ML
INJECTION, SOLUTION INTRAVENOUS
Status: DISCONTINUED | OUTPATIENT
Start: 2023-09-14 | End: 2023-09-14

## 2023-09-14 RX ORDER — BUPIVACAINE HYDROCHLORIDE 2.5 MG/ML
INJECTION, SOLUTION EPIDURAL; INFILTRATION; INTRACAUDAL
Status: DISCONTINUED | OUTPATIENT
Start: 2023-09-14 | End: 2023-09-14 | Stop reason: HOSPADM

## 2023-09-14 RX ORDER — FENTANYL CITRATE 50 UG/ML
INJECTION, SOLUTION INTRAMUSCULAR; INTRAVENOUS
Status: DISCONTINUED | OUTPATIENT
Start: 2023-09-14 | End: 2023-09-14

## 2023-09-14 RX ORDER — DEXAMETHASONE SODIUM PHOSPHATE 4 MG/ML
INJECTION, SOLUTION INTRA-ARTICULAR; INTRALESIONAL; INTRAMUSCULAR; INTRAVENOUS; SOFT TISSUE
Status: DISCONTINUED | OUTPATIENT
Start: 2023-09-14 | End: 2023-09-14

## 2023-09-14 RX ORDER — LIDOCAINE HYDROCHLORIDE 10 MG/ML
1 INJECTION, SOLUTION EPIDURAL; INFILTRATION; INTRACAUDAL; PERINEURAL ONCE AS NEEDED
Status: DISCONTINUED | OUTPATIENT
Start: 2023-09-14 | End: 2023-09-16 | Stop reason: HOSPADM

## 2023-09-14 RX ORDER — HYDROMORPHONE HYDROCHLORIDE 1 MG/ML
0.2 INJECTION, SOLUTION INTRAMUSCULAR; INTRAVENOUS; SUBCUTANEOUS EVERY 5 MIN PRN
Status: COMPLETED | OUTPATIENT
Start: 2023-09-14 | End: 2023-09-14

## 2023-09-14 RX ADMIN — HYDROMORPHONE HYDROCHLORIDE 0.2 MG: 1 INJECTION, SOLUTION INTRAMUSCULAR; INTRAVENOUS; SUBCUTANEOUS at 11:09

## 2023-09-14 RX ADMIN — ENOXAPARIN SODIUM 40 MG: 40 INJECTION SUBCUTANEOUS at 04:09

## 2023-09-14 RX ADMIN — Medication 200 MCG: at 08:09

## 2023-09-14 RX ADMIN — CEFAZOLIN 2 G: 330 INJECTION, POWDER, FOR SOLUTION INTRAMUSCULAR; INTRAVENOUS at 07:09

## 2023-09-14 RX ADMIN — HYDROMORPHONE HYDROCHLORIDE 0.2 MG: 1 INJECTION, SOLUTION INTRAMUSCULAR; INTRAVENOUS; SUBCUTANEOUS at 10:09

## 2023-09-14 RX ADMIN — KETOROLAC TROMETHAMINE 30 MG: 30 INJECTION, SOLUTION INTRAMUSCULAR; INTRAVENOUS at 10:09

## 2023-09-14 RX ADMIN — LIDOCAINE HYDROCHLORIDE 100 MG: 20 INJECTION, SOLUTION EPIDURAL; INFILTRATION; INTRACAUDAL at 07:09

## 2023-09-14 RX ADMIN — GABAPENTIN 400 MG: 300 CAPSULE ORAL at 06:09

## 2023-09-14 RX ADMIN — ACETAMINOPHEN 1000 MG: 500 TABLET ORAL at 06:09

## 2023-09-14 RX ADMIN — METHOCARBAMOL 500 MG: 500 TABLET ORAL at 12:09

## 2023-09-14 RX ADMIN — ONDANSETRON 4 MG: 2 INJECTION INTRAMUSCULAR; INTRAVENOUS at 10:09

## 2023-09-14 RX ADMIN — Medication 10 MG: at 09:09

## 2023-09-14 RX ADMIN — PROPOFOL 200 MG: 10 INJECTION, EMULSION INTRAVENOUS at 07:09

## 2023-09-14 RX ADMIN — SUGAMMADEX 400 MG: 100 INJECTION, SOLUTION INTRAVENOUS at 10:09

## 2023-09-14 RX ADMIN — SODIUM CHLORIDE: 0.9 INJECTION, SOLUTION INTRAVENOUS at 07:09

## 2023-09-14 RX ADMIN — HYDROMORPHONE HYDROCHLORIDE 0.2 MG: 1 INJECTION, SOLUTION INTRAMUSCULAR; INTRAVENOUS; SUBCUTANEOUS at 12:09

## 2023-09-14 RX ADMIN — GABAPENTIN 300 MG: 300 CAPSULE ORAL at 03:09

## 2023-09-14 RX ADMIN — ROCURONIUM BROMIDE 50 MG: 10 INJECTION, SOLUTION INTRAVENOUS at 08:09

## 2023-09-14 RX ADMIN — MIDAZOLAM 2 MG: 1 INJECTION INTRAMUSCULAR; INTRAVENOUS at 07:09

## 2023-09-14 RX ADMIN — OXYCODONE HYDROCHLORIDE 10 MG: 10 TABLET ORAL at 10:09

## 2023-09-14 RX ADMIN — OXYCODONE HYDROCHLORIDE 10 MG: 10 TABLET ORAL at 03:09

## 2023-09-14 RX ADMIN — DOCUSATE SODIUM 50 MG: 50 CAPSULE, LIQUID FILLED ORAL at 11:09

## 2023-09-14 RX ADMIN — FENTANYL CITRATE 200 MCG: 50 INJECTION, SOLUTION INTRAMUSCULAR; INTRAVENOUS at 07:09

## 2023-09-14 RX ADMIN — PANTOPRAZOLE SODIUM 40 MG: 40 TABLET, DELAYED RELEASE ORAL at 11:09

## 2023-09-14 RX ADMIN — METHOCARBAMOL 500 MG: 500 TABLET ORAL at 04:09

## 2023-09-14 RX ADMIN — GABAPENTIN 300 MG: 300 CAPSULE ORAL at 10:09

## 2023-09-14 RX ADMIN — PROPOFOL 50 MG: 10 INJECTION, EMULSION INTRAVENOUS at 07:09

## 2023-09-14 RX ADMIN — Medication 10 MG: at 10:09

## 2023-09-14 RX ADMIN — Medication 100 MCG: at 08:09

## 2023-09-14 RX ADMIN — METHOCARBAMOL 500 MG: 500 TABLET ORAL at 10:09

## 2023-09-14 RX ADMIN — HYDROMORPHONE HYDROCHLORIDE 0.5 MG: 1 INJECTION, SOLUTION INTRAMUSCULAR; INTRAVENOUS; SUBCUTANEOUS at 10:09

## 2023-09-14 RX ADMIN — DEXAMETHASONE SODIUM PHOSPHATE 4 MG: 4 INJECTION INTRA-ARTICULAR; INTRALESIONAL; INTRAMUSCULAR; INTRAVENOUS; SOFT TISSUE at 07:09

## 2023-09-14 RX ADMIN — ACETAMINOPHEN 1000 MG: 500 TABLET ORAL at 10:09

## 2023-09-14 RX ADMIN — ROCURONIUM BROMIDE 70 MG: 10 INJECTION, SOLUTION INTRAVENOUS at 07:09

## 2023-09-14 RX ADMIN — SODIUM CHLORIDE, SODIUM GLUCONATE, SODIUM ACETATE, POTASSIUM CHLORIDE, MAGNESIUM CHLORIDE, SODIUM PHOSPHATE, DIBASIC, AND POTASSIUM PHOSPHATE: .53; .5; .37; .037; .03; .012; .00082 INJECTION, SOLUTION INTRAVENOUS at 07:09

## 2023-09-14 RX ADMIN — ROCURONIUM BROMIDE 30 MG: 10 INJECTION, SOLUTION INTRAVENOUS at 07:09

## 2023-09-14 RX ADMIN — DEXMEDETOMIDINE 12 MCG: 100 INJECTION, SOLUTION, CONCENTRATE INTRAVENOUS at 10:09

## 2023-09-14 RX ADMIN — POLYETHYLENE GLYCOL 3350 17 G: 17 POWDER, FOR SOLUTION ORAL at 10:09

## 2023-09-14 RX ADMIN — ROCURONIUM BROMIDE 30 MG: 10 INJECTION, SOLUTION INTRAVENOUS at 09:09

## 2023-09-14 RX ADMIN — POLYETHYLENE GLYCOL 3350 17 G: 17 POWDER, FOR SOLUTION ORAL at 11:09

## 2023-09-14 RX ADMIN — Medication 30 MG: at 08:09

## 2023-09-14 NOTE — OP NOTE
Date of Surgery: 9/14/23  Preoperative Diagnosis:  Left diaphragmatic paralysis, exertional dyspnea  Postoperative Diagnosis: Same  Procedure:  Robotic diaphragm plication, intercostal nerve block 2 or more intercostal levels  Surgeon: Ponce Knight MD  First Assistant: Raina Cedillo MD  Bedside assistant:  Omar Faith PA-C  Anesthesia: GETA, 7 level intercostal Exparel/Marcaine/saline  EBL: <5mL    Surgery in Detail:     The patient has a history of exertional dyspnea and an elevated left hemidiaphragm.  Preoperative sniff test showed left hemidiaphragm paralysis with paradoxical motion.  Diaphragmatic plication is indicated.      The patient was taken to the operating room placed supine and identified.  General anesthesia was established with double-lumen tube placement.  Tube placement was confirmed fiberoptically.  The patient was positioned in a right lateral decubitus position all pressure points were padded and the left lung was isolated.  The left chest was prepped and draped and a time-out was performed.  Robotic ports were placed in the 7th interspace along with a 12 mm assistant port placed in the 9th interspace.  Carbon dioxide was insufflated and a 7 level intercostal nerve block was performed.  The robot was docked.  The diaphragm was elevated and had some degree of laxity.  Plication was performed using a series of # 0 Ethibond sutures in a horizontal mattress fashion.  There was a noticeable decrease in the diaphragmatic height.  A 24 Irish Geraldo drain was inserted and secured with silk suture.  All robotic instrumentation and ports were removed.  All port sites were hemostatic.  Left lung ventilation was restored with complete lung expansion.  All port sites were closed in 2 layers using absorbable suture.  A sterile dressing was applied.  The patient was extubated transported to the PACU stable.    Bedside assistant attestation:  Omar Faith PA-C functioned as a bedside 1st assistant.   His duties included robotic docking, instrument exchange, and specimen retrieval throughout the entire surgery.  There was no qualified resident available to function as a bedside first assistant given the case complexity and extent of duties described above.    Attending attestation: I was present for and either directly assisted with or performed the critical and key portions of the procedure.     Thoracic (Pulmonary) Cancer - Synoptic Operative Report Summary    Side of surgery Left   Surgical approach Robotic   Tumor type Other - non cancer   Which lymph nodes were submitted as separate specimens?    What pre-operative therapies did the patient receive?

## 2023-09-14 NOTE — BRIEF OP NOTE
[facility] - Surgery ([facility])  Brief Operative Note    SUMMARY     Surgery Date: 9/14/2023     Surgeon(s) and Role:     * Ponce Knight MD - Primary     * Raina Cedillo MD - Resident - Assisting        Pre-op Diagnosis:  Elevated diaphragm [J98.6]  Diaphragmatic paralysis [J98.6]    Post-op Diagnosis:  Post-Op Diagnosis Codes:     * Elevated diaphragm [J98.6]     * Diaphragmatic paralysis [J98.6]    Procedure(s) (LRB):  XI ROBOTIC PLICATION, DIAPHRAGM (Left)  BLOCK, NERVE, INTERCOSTAL, 2 OR MORE (Left)    Anesthesia: General    Operative Findings: Robotic left diaphragm plication with nerve block. Left chest tube placed.     Estimated Blood Loss: minimal    Estimated Blood Loss has been documented.         Specimens:   Specimen (24h ago, onward)      None            [id number]

## 2023-09-14 NOTE — ANESTHESIA PROCEDURE NOTES
Arterial    Diagnosis: diaphragmatic paralysis    Patient location during procedure: done in OR    Staffing  Authorizing Provider: LICHA Cabrera MD  Performing Provider: Cammy Reyes MD    Staffing  Performed by: Cammy Reyes MD  Authorized by: LICHA Cabrera MD    Anesthesiologist was present at the time of the procedure.    Preanesthetic Checklist  Completed: patient identified, IV checked, site marked, risks and benefits discussed, surgical consent, monitors and equipment checked, pre-op evaluation, timeout performed and anesthesia consent givenArterial  Skin Prep: chlorhexidine gluconate  Orientation: left  Location: radial    Catheter Size: 20 G  Catheter placement by Ultrasound guidance. Heme positive aspiration all ports.   Vessel Caliber: patent  Needle advanced into vessel with real time Ultrasound guidance.Insertion Attempts: 1  Assessment  Dressing: secured with tape and tegaderm  Patient: Tolerated well

## 2023-09-14 NOTE — ANESTHESIA POSTPROCEDURE EVALUATION
Anesthesia Post Evaluation    Patient: Michele Chi Jr.    Procedure(s) Performed: Procedure(s) (LRB):  XI ROBOTIC PLICATION, DIAPHRAGM (Left)  BLOCK, NERVE, INTERCOSTAL, 2 OR MORE (Left)    Final Anesthesia Type: general      Patient location during evaluation: PACU  Patient participation: Yes- Able to Participate  Level of consciousness: awake and alert  Post-procedure vital signs: reviewed and stable  Pain management: adequate  Airway patency: patent    PONV status at discharge: No PONV  Anesthetic complications: no      Cardiovascular status: blood pressure returned to baseline  Respiratory status: unassisted, spontaneous ventilation and room air  Hydration status: euvolemic            Vitals Value Taken Time   /65 09/14/23 1501   Temp 36.4 °C (97.6 °F) 09/14/23 1300   Pulse 63 09/14/23 1508   Resp 13 09/14/23 1508   SpO2 94 % 09/14/23 1508   Vitals shown include unvalidated device data.      Event Time   Out of Recovery 12:25:00         Pain/Yoseph Score: Pain Rating Prior to Med Admin: 7 (9/14/2023 12:25 PM)  Pain Rating Post Med Admin: 4 (9/14/2023  2:30 PM)  Yoseph Score: 9 (9/14/2023 12:25 PM)

## 2023-09-14 NOTE — NURSING
Pt arrived on unit with transport by bed.  Pt oriented to room.  Chest tube to water seal.  Vitals will be taken.

## 2023-09-14 NOTE — TRANSFER OF CARE
"Anesthesia Transfer of Care Note    Patient: Michele Chi Jr.    Procedure(s) Performed: Procedure(s) (LRB):  XI ROBOTIC PLICATION, DIAPHRAGM (Left)  BLOCK, NERVE, INTERCOSTAL, 2 OR MORE (Left)    Patient location: PACU    Anesthesia Type: general    Transport from OR: Transported from OR on 6-10 L/min O2 by face mask with adequate spontaneous ventilation    Post pain: adequate analgesia    Post assessment: no apparent anesthetic complications    Post vital signs: stable    Level of consciousness: awake    Complications: none    Transfer of care protocol was followed      Last vitals:   Visit Vitals  BP (!) 143/93 (BP Location: Right arm, Patient Position: Lying)   Pulse 65   Temp 36.6 °C (97.9 °F) (Temporal)   Resp 16   Ht 6' 1" (1.854 m)   Wt 118.4 kg (261 lb)   SpO2 97%   BMI 34.43 kg/m²     "

## 2023-09-14 NOTE — PLAN OF CARE
Problem: Adult Inpatient Plan of Care  Goal: Plan of Care Review  Outcome: Ongoing, Progressing  Goal: Patient-Specific Goal (Individualized)  Outcome: Ongoing, Progressing  Goal: Absence of Hospital-Acquired Illness or Injury  Outcome: Ongoing, Progressing  Goal: Optimal Comfort and Wellbeing  Outcome: Ongoing, Progressing  Goal: Readiness for Transition of Care  Outcome: Ongoing, Progressing      Quality 130: Documentation Of Current Medications In The Medical Record: Current Medications Documented Quality 431: Preventive Care And Screening: Unhealthy Alcohol Use - Screening: Patient not identified as an unhealthy alcohol user when screened for unhealthy alcohol use using a systematic screening method Detail Level: Detailed Quality 226: Preventive Care And Screening: Tobacco Use: Screening And Cessation Intervention: Patient screened for tobacco use and is an ex/non-smoker

## 2023-09-14 NOTE — H&P
History & Physical     SUBJECTIVE:      History of Present Illness:  51 year old male with PMH of HTN, WM on CPAP and GERD presents for evaluation of elevated left alex-diaphragm. Reports dyspnea with little exertion that has been worked up by Pulmonology and Cardiology for about a year. He reports he notices dyspnea at night after he takes a shower, when lying flat and when walking.  He walks on his breaks at work and his coworker commented on how heavy he was breathing. COVID positive in April 2020. No history of cervical spine or cardiac surgeries or procedures. He does have diffuse spondylotic changes in his cervical spine resulting in multilevel mild spinal canal narrowing. Report intermittent left upper extremity neuropathy, numbness and tingling.      Interval: 05/26/23: Patient called to cancel his surgery. Reported that he had been having more issues with neck pain and upper extremity neuropathies related to cervical spine disease. At that time, he elected to address the above issues prior to having surgery on his diaphragm. Patient to call and reschedule once his spine issues have been addressed.      Today patient reports he underwent fusion of of his cervical spine C4-C7 October 2022. Doing well following surgery. However, patient does report worsening SOB, reflux, loss of appetite, and occasional pain along left chest following dermatomal distribution. Denies fever, cough, chills, palpitations, dizziness, syncope.      PSH of appendectomy, hemorrhoidectomy, tonsillectomy and adenoidectomy. No significant trauma history.  Never smoker. Denies EtOH. Works in an office.        Interval History 9/14/23:   Here for robotic diaphragm plication. No changes since last seen in clinic.       Chief Complaint   Patient presents with    Follow-up         Review of patient's allergies indicates:  No Known Allergies            Current Outpatient Medications   Medication Sig Dispense Refill     lisinopriL-hydrochlorothiazide (PRINZIDE,ZESTORETIC) 20-12.5 mg per tablet Take 1 tablet by mouth once daily.        meloxicam (MOBIC) 15 MG tablet Take 15 mg by mouth once daily.        pantoprazole (PROTONIX) 40 MG tablet Take 40 mg by mouth 2 (two) times daily. Take prior to breakfast and supper.          No current facility-administered medications for this visit.                Facility-Administered Medications Ordered in Other Visits   Medication Dose Route Frequency Provider Last Rate Last Admin    0.9%  NaCl infusion   Intravenous Continuous Yuriy Wilburn MD   Stopped at 05/14/21 1047              Past Medical History:   Diagnosis Date    Hypertension      Internal hemorrhoids      Restless leg syndrome              Past Surgical History:   Procedure Laterality Date    APPENDECTOMY        ARTHROSCOPY OF KNEE Left      ESOPHAGOGASTRODUODENOSCOPY N/A 5/14/2021     Procedure: EGD (ESOPHAGOGASTRODUODENOSCOPY);  Surgeon: Yuriy Wilburn MD;  Location: [facility];  Service: Endoscopy;  Laterality: N/A;    HEMORRHOID SURGERY        TONSILLECTOMY         addenoidectomy    TYMPANOSTOMY TUBE PLACEMENT                Family History   Problem Relation Age of Onset    Hypertension Father        Social History           Tobacco Use    Smoking status: Never    Smokeless tobacco: Never   Substance Use Topics    Alcohol use: Never    Drug use: Never         Review of Systems:  Review of Systems   Constitutional:  Negative for appetite change, fatigue and fever.   HENT:  Negative for trouble swallowing and voice change.    Respiratory:  Positive for shortness of breath and wheezing. Negative for cough.    Gastrointestinal:  Negative for abdominal pain, diarrhea and vomiting.   Musculoskeletal:  Positive for arthralgias (LUE neuropathy) and neck pain. Negative for myalgias.   Neurological:  Negative for weakness, light-headedness, numbness and headaches.   Psychiatric/Behavioral:  Negative for confusion.         "  OBJECTIVE:      Vital Signs (Most Recent)  Pulse: 61 (08/04/23 0908)  BP: (!) 141/90 (08/04/23 0908)  SpO2: 97 % (08/04/23 0908)  6' 1" (1.854 m)  118.5 kg (261 lb 3.9 oz)   Body mass index is 34.47 kg/m².     Physical Exam:  Physical Exam  Constitutional:       Appearance: Normal appearance.   Cardiovascular:      Rate and Rhythm: Normal rate and regular rhythm.      Pulses: Normal pulses.   Pulmonary:      Effort: Pulmonary effort is normal.      Breath sounds: Wheezing (Expiratory wheeze) present.   Abdominal:      General: Abdomen is flat.      Palpations: Abdomen is soft.   Musculoskeletal:      Right lower leg: No edema.      Left lower leg: No edema.   Neurological:      General: No focal deficit present.      Mental Status: He is alert and oriented to person, place, and time.   Psychiatric:         Mood and Affect: Mood normal.         Diagnostic Results:     6/25/21- CT Chest without Contrast:  The central airways are patent and clear.  Subsegmental atelectasis versus scarring in the bilateral lower lobes.  Mild chronic elevation bilateral hemidiaphragm.  No pleural or pericardial fluid.  No aortic aneurysm.  No hilar or mediastinal lymph node enlargement.  Visualized portion of the upper abdomen appears unremarkable.  No axillary lymph node enlargement.  No osseous abnormality.     6/25/21- PFTs:  FEV1- 2.7 63%  DLCO- 27 75%     7/15/21- 2D ECHO:       11/5/21- CT Cervical Spine:  1. Diffuse spondylotic change resulting in multilevel mild spinal canal narrowing and varying degrees of mild to severe foraminal narrowing with individual levels detailed above.  2. Reversal of the normal cervical curvature may be secondary to positioning or spasm     4/27/22- SNIFF: Elevated left hemidiaphragm with paradoxical upward motion, suggesting phrenic nerve palsy.     SNIFF 07/21/23:   Left hemidiaphragm appears elevated similar to priors.  Subtle reduced downward movement of the left hemidiaphragm as compared to the " right with normal breathing and sniffing.  Impression: Findings consistent with left hemidiaphragm palsy.     ASSESSMENT/PLAN:      51 year old male with PMH of HTN, WM on CPAP and GERD presents for evaluation of elevated left alex-diaphragm.     Previously offered robotic left hemidiaphragm plication 06/2022; however, this was cancelled at his request so that he could address his ongoing cervical spine symptoms.   Vague left costal margin discomfort  Exertional dyspnea  Left costal margin pain and exertional dyspnea may not be due to left hemidiaphragm paralysis.  I explained to the patient and his wife in great detail that there is the possibility that these symptoms will persist despite diaphragm plication.  I strongly suggested that the symptom etiology may be multifactorial.    Possible significant intrathoracic adhesion formation secondary to COVID infection     PLAN:  Plan   Order updated ECHO     Scheduled for robotic left diaphragm plication  I have discussed the technical aspects, risks and benefits of robotic diaphragm plication possible left thoracotomy with the patient.  I informed the patient that the risks are the most common risks and that there are other less likely risks that are too numerous to elaborate.  The patient is aware and has agreed to undergo the procedure as detailed on the consent form.     Consents for blood and surgery obtained    INTERVAL HISTORY 9/14/23:   Patient to OR today for robotic diaphragm plication.  Consents obtained.    Raina Cedillo MD  General Surgery PGY-IV  Pager [phone]

## 2023-09-14 NOTE — NURSING TRANSFER
Nursing Transfer Note      9/14/2023   2:42 PM    Reason patient is being transferred: Post procedure    Transfer To: 1026    Transfer via bed    Transfer with Chest tube    Transported by Transport    Order for Tele Monitor? No    Additional Lines: Chest Tube    Any special needs or follow-up needed: Routine    Patient belongings transferred with patient: No    Chart send with patient: Yes    Notified: spouse    Patient reassessed at: 09/14/2023 @8150

## 2023-09-14 NOTE — ANESTHESIA PROCEDURE NOTES
Intubation    Date/Time: 9/14/2023 7:12 AM    Performed by: Cammy Reyes MD  Authorized by: LICHA Cabrera MD    Intubation:     Mask Ventilation:  Moderately difficult with oral airway    Attempts:  1    Attempted By:  Resident anesthesiologist    Method of Intubation:  Video laryngoscopy    Blade:  Craig 4    Laryngeal View Grade: Grade I - full view of cords      Difficult Airway Encountered?: No      Complications:  None    Airway Device:  Double lumen tube left    Airway Device Size:  41F    Style/Cuff Inflation:  Cuffed    Inflation Amount (mL):  10    Tube secured:  27    Secured at:  The lips    Placement Verified By:  Capnometry and Fiber optic visualization    Complicating Factors:  Poor neck/head extension    Findings Post-Intubation:  BS equal bilateral and atraumatic/condition of teeth unchanged

## 2023-09-15 DIAGNOSIS — J98.6 ELEVATED DIAPHRAGM: Primary | ICD-10-CM

## 2023-09-15 PROBLEM — G56.80 PHRENIC NERVE PALSY: Status: ACTIVE | Noted: 2023-09-15

## 2023-09-15 PROCEDURE — 20600001 HC STEP DOWN PRIVATE ROOM

## 2023-09-15 PROCEDURE — 99900035 HC TECH TIME PER 15 MIN (STAT)

## 2023-09-15 PROCEDURE — 25000003 PHARM REV CODE 250: Performed by: STUDENT IN AN ORGANIZED HEALTH CARE EDUCATION/TRAINING PROGRAM

## 2023-09-15 PROCEDURE — 94761 N-INVAS EAR/PLS OXIMETRY MLT: CPT

## 2023-09-15 PROCEDURE — 25000003 PHARM REV CODE 250

## 2023-09-15 PROCEDURE — 63600175 PHARM REV CODE 636 W HCPCS: Performed by: STUDENT IN AN ORGANIZED HEALTH CARE EDUCATION/TRAINING PROGRAM

## 2023-09-15 RX ORDER — METHOCARBAMOL 750 MG/1
750 TABLET, FILM COATED ORAL 4 TIMES DAILY
Status: DISCONTINUED | OUTPATIENT
Start: 2023-09-15 | End: 2023-09-16 | Stop reason: HOSPADM

## 2023-09-15 RX ORDER — LIDOCAINE 50 MG/G
2 PATCH TOPICAL
Status: DISCONTINUED | OUTPATIENT
Start: 2023-09-15 | End: 2023-09-16 | Stop reason: HOSPADM

## 2023-09-15 RX ORDER — ACETAMINOPHEN 500 MG
1000 TABLET ORAL EVERY 8 HOURS
Refills: 0 | COMMUNITY
Start: 2023-09-15 | End: 2024-01-02

## 2023-09-15 RX ORDER — GUAIFENESIN 600 MG/1
600 TABLET, EXTENDED RELEASE ORAL 2 TIMES DAILY
Status: DISCONTINUED | OUTPATIENT
Start: 2023-09-15 | End: 2023-09-16 | Stop reason: HOSPADM

## 2023-09-15 RX ORDER — OXYCODONE HYDROCHLORIDE 5 MG/1
5 TABLET ORAL EVERY 4 HOURS PRN
Qty: 41 TABLET | Refills: 0 | Status: SHIPPED | OUTPATIENT
Start: 2023-09-15 | End: 2024-01-02

## 2023-09-15 RX ORDER — OXYCODONE HYDROCHLORIDE 10 MG/1
10 TABLET ORAL EVERY 4 HOURS PRN
Status: DISCONTINUED | OUTPATIENT
Start: 2023-09-15 | End: 2023-09-16 | Stop reason: HOSPADM

## 2023-09-15 RX ORDER — GABAPENTIN 300 MG/1
300 CAPSULE ORAL NIGHTLY
Qty: 30 CAPSULE | Refills: 11 | Status: SHIPPED | OUTPATIENT
Start: 2023-09-15 | End: 2024-04-03

## 2023-09-15 RX ORDER — METHOCARBAMOL 750 MG/1
750 TABLET, FILM COATED ORAL 4 TIMES DAILY
Qty: 40 TABLET | Refills: 0 | Status: SHIPPED | OUTPATIENT
Start: 2023-09-15 | End: 2023-09-25

## 2023-09-15 RX ORDER — GABAPENTIN 400 MG/1
400 CAPSULE ORAL 3 TIMES DAILY
Status: DISCONTINUED | OUTPATIENT
Start: 2023-09-15 | End: 2023-09-16 | Stop reason: HOSPADM

## 2023-09-15 RX ORDER — LIDOCAINE 50 MG/G
1 PATCH TOPICAL
Status: DISCONTINUED | OUTPATIENT
Start: 2023-09-15 | End: 2023-09-15

## 2023-09-15 RX ORDER — GUAIFENESIN 600 MG/1
1200 TABLET, EXTENDED RELEASE ORAL 2 TIMES DAILY
Qty: 40 TABLET | Refills: 0 | Status: SHIPPED | OUTPATIENT
Start: 2023-09-15 | End: 2023-09-25

## 2023-09-15 RX ADMIN — LIDOCAINE 5% 2 PATCH: 700 PATCH TOPICAL at 07:09

## 2023-09-15 RX ADMIN — OXYCODONE HYDROCHLORIDE 10 MG: 10 TABLET ORAL at 07:09

## 2023-09-15 RX ADMIN — OXYCODONE HYDROCHLORIDE 10 MG: 10 TABLET ORAL at 11:09

## 2023-09-15 RX ADMIN — POLYETHYLENE GLYCOL 3350 17 G: 17 POWDER, FOR SOLUTION ORAL at 08:09

## 2023-09-15 RX ADMIN — POLYETHYLENE GLYCOL 3350 17 G: 17 POWDER, FOR SOLUTION ORAL at 09:09

## 2023-09-15 RX ADMIN — ACETAMINOPHEN 1000 MG: 500 TABLET ORAL at 05:09

## 2023-09-15 RX ADMIN — ENOXAPARIN SODIUM 40 MG: 40 INJECTION SUBCUTANEOUS at 05:09

## 2023-09-15 RX ADMIN — OXYCODONE HYDROCHLORIDE 10 MG: 10 TABLET ORAL at 03:09

## 2023-09-15 RX ADMIN — METHOCARBAMOL 750 MG: 750 TABLET ORAL at 08:09

## 2023-09-15 RX ADMIN — PANTOPRAZOLE SODIUM 40 MG: 40 TABLET, DELAYED RELEASE ORAL at 08:09

## 2023-09-15 RX ADMIN — GUAIFENESIN 600 MG: 600 TABLET, EXTENDED RELEASE ORAL at 09:09

## 2023-09-15 RX ADMIN — METHOCARBAMOL 750 MG: 750 TABLET ORAL at 09:09

## 2023-09-15 RX ADMIN — GABAPENTIN 400 MG: 400 CAPSULE ORAL at 08:09

## 2023-09-15 RX ADMIN — METHOCARBAMOL 750 MG: 750 TABLET ORAL at 05:09

## 2023-09-15 RX ADMIN — GABAPENTIN 400 MG: 400 CAPSULE ORAL at 03:09

## 2023-09-15 RX ADMIN — GABAPENTIN 400 MG: 400 CAPSULE ORAL at 09:09

## 2023-09-15 RX ADMIN — ACETAMINOPHEN 1000 MG: 500 TABLET ORAL at 01:09

## 2023-09-15 RX ADMIN — ACETAMINOPHEN 1000 MG: 500 TABLET ORAL at 09:09

## 2023-09-15 RX ADMIN — METHOCARBAMOL 750 MG: 750 TABLET ORAL at 01:09

## 2023-09-15 RX ADMIN — OXYCODONE HYDROCHLORIDE 10 MG: 10 TABLET ORAL at 02:09

## 2023-09-15 RX ADMIN — DOCUSATE SODIUM 50 MG: 50 CAPSULE, LIQUID FILLED ORAL at 08:09

## 2023-09-15 NOTE — HOSPITAL COURSE
Patient admitted 09/14/23 following robotic-assisted diaphragm plication. Tolerated procedure well. POD1 Pain controlled. Chest tube removed and patient ready for discharge home in good condition following discharge discussion.

## 2023-09-15 NOTE — DISCHARGE SUMMARY
[facility]  Thoracic Surgery  Discharge Summary    Patient Name: Michele Chi Jr.  MRN: [id number]  Admission Date: 9/14/2023  Hospital Length of Stay: 1 days  Discharge Date and Time:  09/15/2023 3:18 PM  Attending Physician: Ponce Knight MD   Discharging Provider: Omar Faith PA-C  Primary Care Provider: Nahun Menchaca MD    HPI:   No notes on file    Procedure(s) (LRB):  XI ROBOTIC PLICATION, DIAPHRAGM (Left)  BLOCK, NERVE, INTERCOSTAL, 2 OR MORE (Left)      Hospital Course: Patient admitted 09/14/23 following robotic-assisted diaphragm plication. Tolerated procedure well. POD1 Pain controlled. Chest tube removed and patient ready for discharge home in good condition following discharge discussion.       Goals of Care Treatment Preferences:  Code Status: Full Code          Significant Diagnostic Studies: Radiology: X-Ray: CXR: X-Ray Chest 1 View (CXR):   Results for orders placed or performed during the hospital encounter of 09/14/23   X-Ray Chest 1 View    Narrative    EXAMINATION:  XR CHEST 1 VIEW    CLINICAL HISTORY:  s/p diaphragm plication on left;    TECHNIQUE:  Single frontal view of the chest was performed.    COMPARISON:  09/23/2022    FINDINGS:  The heart size is normal.  Mediastinal structures are midline.  Lung volumes are low with mild relative elevation of right diaphragm.  Right lung is relatively clear without significant consolidation.  A small pleural effusion is present on the right.  Left lower lung zone shows minimal, patchy opacification, could indicate atelectasis, pneumonia, aspiration, etc..  Tubing on the left side near the diaphragm is presumably a chest tube in this patient with history of diaphragm plication.  No significant pleural fluid or pneumothorax is detected on the left.    Skeletal structures show fusion hardware at the lower C-spine.      Impression    Status post left thoracotomy.      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 Go to.    Specialty: Thoracic Diseases  Contact information:  [address]  [place] [address]  [phone]                       Patient Instructions:      No driving until:   Order Comments: No driving until all narcotic pain medications have been discontinued.     Lifting restrictions   Order Comments: Do NOT lift anything greater than 25 lbs until 2 week follow up appointment.     Notify your health care provider if you experience any of the following:  temperature >100.4     Notify your health care provider if you experience any of the following:  persistent nausea and vomiting or diarrhea     Notify your health care provider if you experience any of the following:  severe uncontrolled pain     Notify your health care provider if you experience any of the following:  redness, tenderness, or signs of infection (pain, swelling, redness, odor or green/yellow discharge around incision site)     Notify your health care provider if you experience any of the following:  difficulty breathing or increased cough     Notify your health care provider if you experience any of the following:  severe persistent headache     Notify your health care provider if you experience any of the following:  worsening rash     Notify your health care provider if you experience any of the following:  persistent dizziness, light-headedness, or visual disturbances     Notify your health care provider if you experience any of the following:  increased confusion or weakness     Remove dressing in 48 hours     Activity as tolerated      Medications:  Reconciled Home Medications:      Medication List      START taking these medications    acetaminophen 500 MG tablet  Commonly known as: TYLENOL  Take 2 tablets (1,000 mg total) by mouth every 8 (eight) hours.     gabapentin 300 MG capsule  Commonly known as: NEURONTIN  Take 1 capsule (300 mg total) by mouth every evening.     guaiFENesin 600 mg 12 hr tablet  Commonly known as: MUCINEX  Take 2 tablets (1,200 mg total) by mouth 2 (two) times daily. for 10 days     methocarbamoL 750 MG Tab  Commonly known as: ROBAXIN  Take 1 tablet (750 mg total) by mouth 4 (four) times daily. for 10 days     oxyCODONE 5 MG immediate release tablet  Commonly known as: ROXICODONE  Take 1 tablet (5 mg total) by mouth every 4 (four) hours as needed for Pain.        CONTINUE taking these medications    lisinopriL-hydrochlorothiazide 20-12.5 mg per tablet  Commonly known as: PRINZIDE,ZESTORETIC  Take 1 tablet by mouth once daily.     pantoprazole 40 MG tablet  Commonly known as: PROTONIX  Take 40 mg by mouth daily as needed. Take prior to breakfast and supper.            Omar Faith PA-C  Thoracic Surgery  [facility]

## 2023-09-15 NOTE — PLAN OF CARE
[facility] Plan of Care Note  Dx phrenic nerve palsy on left    Shift Events chest tube pulled out, pain control    Goals of Care: pain control    Neuro: a/ox4    Vital Signs: stable    Respiratory: RA    Diet: reg    Is patient tolerating current diet? yes    GTTS: n/a    Urine Output/Bowel Movement: adeqaute    Drains/Tubes/Tube Feeds (include total output/shift): n/a    Lines: PIV      Accuchecks:n/a    Skin: old chest tube site- C/D/I, lap sitex4    Fall Risk Score: 1    Activity level? SBA    Any scheduled procedures? N/a    Any safety concerns? N/a    Other: home tomorrow   Problem: Adult Inpatient Plan of Care  Goal: Plan of Care Review  Outcome: Ongoing, Progressing  Goal: Patient-Specific Goal (Individualized)  Outcome: Ongoing, Progressing  Goal: Absence of Hospital-Acquired Illness or Injury  Outcome: Ongoing, Progressing  Goal: Optimal Comfort and Wellbeing  Outcome: Ongoing, Progressing  Goal: Readiness for Transition of Care  Outcome: Ongoing, Progressing     Problem: Fall Injury Risk  Goal: Absence of Fall and Fall-Related Injury  Outcome: Ongoing, Progressing

## 2023-09-15 NOTE — PLAN OF CARE
[facility] Plan of Care Note  Dx Elevated diaphragm with diaphragmatic paralysis    Shift Events Pt had uncontrolled pain throughout entire shift. One time dose of dilaudid given. Pt slept for about 3.5/4 hrs. States he only slept for one and it did not help requested oxy frequency be decreased to q4hr instead of 6. Call placed to on-call. Request denied. Pt refused dilaudid. Pt bladder scanned at 0245. 480cc on scan. Pt ambulated to restroom. Scan post void, 5cc.     Goals of Care: Goals of care ongoing and progressing.    Neuro: A/Ox4    Vital Signs: Stable    Respiratory: NC 2L    Diet: Reg    Is patient tolerating current diet? Yes    GTTS: NA    Urine Output/Bowel Movement: Adequate, BM 9/14    Drains/Tubes/Tube Feeds (include total output/shift): Chest tube to L chest. Water seal    Lines: 18g L hand      Accuchecks:NA    Skin: 4 lap sites to L chest, Chest tube incision to L chest wall     Fall Risk Score: Low    Activity level? Ambulates    Any scheduled procedures? NA    Any safety concerns? NA    Problem: Adult Inpatient Plan of Care  Goal: Plan of Care Review  Outcome: Ongoing, Progressing  Goal: Patient-Specific Goal (Individualized)  Outcome: Ongoing, Progressing  Goal: Absence of Hospital-Acquired Illness or Injury  Outcome: Ongoing, Progressing  Goal: Optimal Comfort and Wellbeing  Outcome: Ongoing, Progressing  Goal: Readiness for Transition of Care  Outcome: Ongoing, Progressing     Problem: Fall Injury Risk  Goal: Absence of Fall and Fall-Related Injury  Outcome: Ongoing, Progressing

## 2023-09-15 NOTE — ASSESSMENT & PLAN NOTE
POD1 s/p robotic diaphragm plication    Discharge home   Chest tube discontinued   OOB, ambulate as tolerated  Multimodal pain management  IS use   DVT ppx

## 2023-09-15 NOTE — PROGRESS NOTES
[facility]  General Surgery  Progress Note    Subjective:     History of Present Illness:  No notes on file    Post-Op Info:  Procedure(s) (LRB):  XI ROBOTIC PLICATION, DIAPHRAGM (Left)  BLOCK, NERVE, INTERCOSTAL, 2 OR MORE (Left)   1 Day Post-Op     Interval History:   AFVSS  CT with 150cc SS output  HDS  Issues with pain control overnight  Some coughing this morning  Chest XR clear    Medications:  Continuous Infusions:  Scheduled Meds:   acetaminophen  1,000 mg Oral Q8H    docusate sodium  50 mg Oral Daily    enoxparin  40 mg Subcutaneous Q24H (prophylaxis, 1700)    gabapentin  400 mg Oral TID    LIDOcaine  2 patch Transdermal Q24H    methocarbamoL  750 mg Oral QID    pantoprazole  40 mg Oral Daily    polyethylene glycol  17 g Oral BID     PRN Meds:acetaminophen, LIDOcaine (PF) 10 mg/ml (1%), melatonin, ondansetron, oxyCODONE, oxyCODONE, prochlorperazine, sodium chloride 0.9%, sodium chloride 0.9%     Review of patient's allergies indicates:  No Known Allergies  Objective:     Vital Signs (Most Recent):  Temp: 98.8 °F (37.1 °C) (09/15/23 0456)  Pulse: (!) 58 (09/15/23 0544)  Resp: 18 (09/15/23 0736)  BP: 130/61 (09/15/23 0456)  SpO2: 96 % (09/15/23 0456) Vital Signs (24h Range):  Temp:  [97.3 °F (36.3 °C)-98.8 °F (37.1 °C)] 98.8 °F (37.1 °C)  Pulse:  [57-77] 58  Resp:  [12-24] 18  SpO2:  [91 %-100 %] 96 %  BP: ()/(52-84) 130/61     Weight: 118.8 kg (261 lb 14.5 oz)  Body mass index is 34.55 kg/m².    Intake/Output - Last 3 Shifts         09/13 0700 09/14 0659 09/14 0700  09/15 0659 09/15 0700 09/16 0659    IV Piggyback  1200     Total Intake(mL/kg)  1200 (05792)     Stool  0     Chest Tube  140     Total Output  140     Net  +1060            Stool Occurrence  0 x              Physical Exam  Constitutional:       General: He is not in acute distress.     Appearance: Normal appearance.   HENT:      Head: Normocephalic and atraumatic.      Mouth/Throat:      Mouth: Mucous membranes are moist.    Eyes:      Pupils: Pupils are equal, round, and reactive to light.   Cardiovascular:      Rate and Rhythm: Normal rate.   Pulmonary:      Effort: Pulmonary effort is normal. No respiratory distress.      Comments: L chest tube in place w/ SS output. Tidaling. No air leak.   Abdominal:      General: Abdomen is flat. There is no distension.      Palpations: Abdomen is soft.   Musculoskeletal:         General: Normal range of motion.      Cervical back: Normal range of motion.   Skin:     General: Skin is warm and dry.   Neurological:      General: No focal deficit present.      Mental Status: He is alert and oriented to person, place, and time.   Psychiatric:         Mood and Affect: Mood normal.         Behavior: Behavior normal.          Significant Labs:  I have reviewed all pertinent lab results within the past 24 hours.    Significant Diagnostics:  I have reviewed all pertinent imaging results/findings within the past 24 hours.    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 He tolerated the procedure well.    Chest tube in place to water seal  CXR this morning clear, will plan to remove CT later today  Multimodal pain control, increased frequency today  PRN nausea  Regular diet  Bowel regimen  Home meds as appropriate  DVT ppx  Ambulate  IS    Dispo: possibly home later today         Raina Cedillo MD  General Surgery  [facility]

## 2023-09-15 NOTE — SUBJECTIVE & OBJECTIVE
Interval History:   AFVSS  CT with 150cc SS output  HDS  Issues with pain control overnight  Some coughing this morning  Chest XR clear    Medications:  Continuous Infusions:  Scheduled Meds:   acetaminophen  1,000 mg Oral Q8H    docusate sodium  50 mg Oral Daily    enoxparin  40 mg Subcutaneous Q24H (prophylaxis, 1700)    gabapentin  400 mg Oral TID    LIDOcaine  2 patch Transdermal Q24H    methocarbamoL  750 mg Oral QID    pantoprazole  40 mg Oral Daily    polyethylene glycol  17 g Oral BID     PRN Meds:acetaminophen, LIDOcaine (PF) 10 mg/ml (1%), melatonin, ondansetron, oxyCODONE, oxyCODONE, prochlorperazine, sodium chloride 0.9%, sodium chloride 0.9%     Review of patient's allergies indicates:  No Known Allergies  Objective:     Vital Signs (Most Recent):  Temp: 98.8 °F (37.1 °C) (09/15/23 0456)  Pulse: (!) 58 (09/15/23 0544)  Resp: 18 (09/15/23 0736)  BP: 130/61 (09/15/23 0456)  SpO2: 96 % (09/15/23 0456) Vital Signs (24h Range):  Temp:  [97.3 °F (36.3 °C)-98.8 °F (37.1 °C)] 98.8 °F (37.1 °C)  Pulse:  [57-77] 58  Resp:  [12-24] 18  SpO2:  [91 %-100 %] 96 %  BP: ()/(52-84) 130/61     Weight: 118.8 kg (261 lb 14.5 oz)  Body mass index is 34.55 kg/m².    Intake/Output - Last 3 Shifts         09/13 0700  09/14 0659 09/14 0700  09/15 0659 09/15 0700  09/16 0659    IV Piggyback  1200     Total Intake(mL/kg)  1200 (79151)     Stool  0     Chest Tube  140     Total Output  140     Net  +1060            Stool Occurrence  0 x              Physical Exam  Constitutional:       General: He is not in acute distress.     Appearance: Normal appearance.   HENT:      Head: Normocephalic and atraumatic.      Mouth/Throat:      Mouth: Mucous membranes are moist.   Eyes:      Pupils: Pupils are equal, round, and reactive to light.   Cardiovascular:      Rate and Rhythm: Normal rate.   Pulmonary:      Effort: Pulmonary effort is normal. No respiratory distress.      Comments: L chest tube in place w/ SS output. Tidaling. No  air leak.   Abdominal:      General: Abdomen is flat. There is no distension.      Palpations: Abdomen is soft.   Musculoskeletal:         General: Normal range of motion.      Cervical back: Normal range of motion.   Skin:     General: Skin is warm and dry.   Neurological:      General: No focal deficit present.      Mental Status: He is alert and oriented to person, place, and time.   Psychiatric:         Mood and Affect: Mood normal.         Behavior: Behavior normal.          Significant Labs:  I have reviewed all pertinent lab results within the past 24 hours.    Significant Diagnostics:  I have reviewed all pertinent imaging results/findings within the past 24 hours.

## 2023-09-15 NOTE — ASSESSMENT & PLAN NOTE
Patient is a 53y M w/ hx of a left phrenic nerve palsy who is s/p left diaphragm plication on 9/14/23. He tolerated the procedure well.    Chest tube in place to water seal  CXR this morning clear, will plan to remove CT later today  Multimodal pain control, increased frequency today  PRN nausea  Regular diet  Bowel regimen  Home meds as appropriate  DVT ppx  Ambulate  IS    Dispo: possibly home later today

## 2023-09-16 VITALS
HEIGHT: 73 IN | DIASTOLIC BLOOD PRESSURE: 71 MMHG | BODY MASS INDEX: 34.71 KG/M2 | OXYGEN SATURATION: 95 % | TEMPERATURE: 97 F | SYSTOLIC BLOOD PRESSURE: 143 MMHG | RESPIRATION RATE: 18 BRPM | WEIGHT: 261.94 LBS | HEART RATE: 71 BPM

## 2023-09-16 PROCEDURE — 25000003 PHARM REV CODE 250: Performed by: STUDENT IN AN ORGANIZED HEALTH CARE EDUCATION/TRAINING PROGRAM

## 2023-09-16 PROCEDURE — 25000003 PHARM REV CODE 250

## 2023-09-16 RX ORDER — DIPHENHYDRAMINE HCL 25 MG
25 CAPSULE ORAL EVERY 6 HOURS PRN
Status: DISCONTINUED | OUTPATIENT
Start: 2023-09-16 | End: 2023-09-16 | Stop reason: HOSPADM

## 2023-09-16 RX ADMIN — OXYCODONE HYDROCHLORIDE 10 MG: 10 TABLET ORAL at 05:09

## 2023-09-16 RX ADMIN — LIDOCAINE 5% 2 PATCH: 700 PATCH TOPICAL at 09:09

## 2023-09-16 RX ADMIN — GABAPENTIN 400 MG: 400 CAPSULE ORAL at 08:09

## 2023-09-16 RX ADMIN — DIPHENHYDRAMINE HYDROCHLORIDE 25 MG: 25 CAPSULE ORAL at 09:09

## 2023-09-16 RX ADMIN — GUAIFENESIN 600 MG: 600 TABLET, EXTENDED RELEASE ORAL at 08:09

## 2023-09-16 RX ADMIN — ACETAMINOPHEN 1000 MG: 500 TABLET ORAL at 05:09

## 2023-09-16 RX ADMIN — DOCUSATE SODIUM 50 MG: 50 CAPSULE, LIQUID FILLED ORAL at 08:09

## 2023-09-16 RX ADMIN — METHOCARBAMOL 750 MG: 750 TABLET ORAL at 08:09

## 2023-09-16 RX ADMIN — OXYCODONE HYDROCHLORIDE 10 MG: 10 TABLET ORAL at 09:09

## 2023-09-16 RX ADMIN — PANTOPRAZOLE SODIUM 40 MG: 40 TABLET, DELAYED RELEASE ORAL at 08:09

## 2023-09-16 NOTE — PLAN OF CARE
[facility] Plan of Care Note  Dx Elevated diaphragm with diaphragmatic paralysis     Shift Events Pt pain controlled with oxy q4.      Goals of Care: Goals of care ongoing and progressing.     Neuro: A/Ox4     Vital Signs: Stable     Respiratory: RA     Diet: Reg     Is patient tolerating current diet? Yes     GTTS: NA     Urine Output/Bowel Movement: Adequate, BM 9/14     Drains/Tubes/Tube Feeds (include total output/shift): NA     Lines: 18g L hand, PIV to L forearm        Accuchecks:NA     Skin: 4 lap sites to L chest, Chest tube incision to L chest wall      Fall Risk Score: Low     Activity level? Ambulates     Any scheduled procedures? NA     Any safety concerns? NA    Problem: Adult Inpatient Plan of Care  Goal: Plan of Care Review  Outcome: Ongoing, Progressing  Goal: Patient-Specific Goal (Individualized)  Outcome: Ongoing, Progressing  Goal: Absence of Hospital-Acquired Illness or Injury  Outcome: Ongoing, Progressing  Goal: Optimal Comfort and Wellbeing  Outcome: Ongoing, Progressing     Problem: Fall Injury Risk  Goal: Absence of Fall and Fall-Related Injury  Outcome: Ongoing, Progressing

## 2023-09-16 NOTE — PLAN OF CARE
Tom kemar  [facility]  Discharge Final Note    Primary Care Provider: Nahun Menchaca MD    Expected Discharge Date: 9/16/2023    Final Discharge Note (most recent)       Final Note - 09/16/23 1100          Final Note    Assessment Type Final Discharge Note     Anticipated Discharge Disposition Home or Self Care     What phone number can be called within the next 1-3 days to see how you are doing after discharge? [phone]        Post-Acute Status    Discharge Delays None known at this time                     Important Message from Medicare             Contact Info       Ponce Knight MD   Specialty: Thoracic Diseases    [address] [facility] [address]  [facility] [address]   Phone: [phone]       Next Steps: Go to

## 2023-09-16 NOTE — DISCHARGE SUMMARY
"Tom evelyn [facility]  DISCHARGE SUMMARY  General Surgery      Admit Date:  9/14/2023    Discharge Date:  9/16/2023      Attending Physician:  Ponce Knight MD     Discharge Provider:  Raina Cedillo MD     Reason for Admission:  Phrenic nerve palsy     Procedures Performed:  Procedure(s) (LRB):  XI ROBOTIC PLICATION, DIAPHRAGM (Left)  BLOCK, NERVE, INTERCOSTAL, 2 OR MORE (Left)    Hospital Course:  Please see the preoperative H&P and other available documentation for full details related to history prior to this admission.  Briefly, Michele Chi Jr. is a 53 y.o. male who was admitted following surgery for Phrenic nerve palsy    Following a complete preoperative discussion of the risks and benefits of surgery with signed informed consent, the patient was taken to the operating room on 9/14/2023 and underwent the above stated procedures.  The patient tolerated surgery well and there were no complications.  Please see the operative report for full intraoperative findings and details.      Postoperatively, the patient did well and was transferred from the PACU to the floor in stable condition where they had a stable and uncomplicated hospital course. The post-operative course was uncomplicated.     Labs and vital signs remained stable and appropriate throughout course.  Diet was advanced as tolerated and the patient's pain was controlled on oral pain medications without problem.  Currently, the patient is doing well and is stable and appropriate for discharge at this time.  We encouraged ambulation at home and use of his incentive spirometer frequently.     Consults:  none.    Significant Diagnostic Studies:   Recent Labs   Lab 09/14/23  1137   WBC 12.32   HGB 14.1   HCT 41.7        Recent Labs   Lab 09/14/23  1137   CREATININE 1.2   No results for input(s): "INR", "PTT", "LABHEPA", "LACTATE", "TROPONINI", "CPK", "CPKMB", "MB", "BNP" in the last 72 hours.No results for input(s): "PH", "PCO2", " ""PO2", "HCO3" in the last 72 hours.      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       Discharged Condition:  Good    Disposition: home with family    Follow Up/Patient Instructions:     Medications:  Reconciled Home Medications:    Current Discharge Medication List        START taking these medications    Details   acetaminophen (TYLENOL) 500 MG tablet Take 2 tablets (1,000 mg total) by mouth every 8 (eight) hours.  Refills: 0      gabapentin (NEURONTIN) 300 MG capsule Take 1 capsule (300 mg total) by mouth every evening.  Qty: 30 capsule, Refills: 11      guaiFENesin (MUCINEX) 600 mg 12 hr tablet Take 2 tablets (1,200 mg total) by mouth 2 (two) times daily. for 10 days  Qty: 40 tablet, Refills: 0      methocarbamoL (ROBAXIN) 750 MG Tab Take 1 tablet (750 mg total) by mouth 4 (four) times daily. for 10 days  Qty: 40 tablet, Refills: 0      oxyCODONE (ROXICODONE) 5 MG immediate release tablet Take 1 tablet (5 mg total) by mouth every 4 (four) hours as needed for Pain.  Qty: 41 tablet, Refills: 0    Comments: Quantity prescribed more than 7 day supply? Yes, quantity medically necessary           CONTINUE these medications which have NOT CHANGED    Details   lisinopriL-hydrochlorothiazide (PRINZIDE,ZESTORETIC) 20-12.5 mg per tablet Take 1 tablet by mouth once daily.      pantoprazole (PROTONIX) 40 MG tablet Take 40 mg by mouth daily as needed. Take prior to breakfast and supper.           Discharge Procedure Orders   No driving until:   Order Comments: No driving until all narcotic pain medications have been discontinued.     Lifting restrictions   Order Comments: Do NOT lift anything greater than 25 lbs until 2 week follow up appointment.     Notify your health care provider " if you experience any of the following:  temperature >100.4     Notify your health care provider if you experience any of the following:  persistent nausea and vomiting or diarrhea     Notify your health care provider if you experience any of the following:  severe uncontrolled pain     Notify your health care provider if you experience any of the following:  redness, tenderness, or signs of infection (pain, swelling, redness, odor or green/yellow discharge around incision site)     Notify your health care provider if you experience any of the following:  difficulty breathing or increased cough     Notify your health care provider if you experience any of the following:  severe persistent headache     Notify your health care provider if you experience any of the following:  worsening rash     Notify your health care provider if you experience any of the following:  persistent dizziness, light-headedness, or visual disturbances     Notify your health care provider if you experience any of the following:  increased confusion or weakness     Remove dressing in 48 hours     Activity as tolerated      Follow-up Information       Ponce Knight MD. Go to.    Specialty: Thoracic Diseases  Contact information:  [address] [place] [id number]  [phone]                             Future Appointments   Date Time Provider Department Center   9/19/2023  3:20 PM Lelo Parker MD [facility] PULM [facility] Clinics   9/29/2023  8:45 AM [facility] OIC-XRAY [facility]   9/29/2023  9:15 AM Ponce Knight MD [facility] THORAC Sg Cedillo MD

## 2023-09-16 NOTE — PLAN OF CARE
CM attempted to complete discharge planning assessment however the pt is currently out of the room. CM will return to follow up.    09/16/23 1050   Discharge Assessment   Assessment Type Discharge Planning Assessment

## 2023-09-19 PROBLEM — E66.811 CLASS 1 OBESITY IN ADULT: Status: ACTIVE | Noted: 2023-09-19

## 2023-09-19 PROBLEM — E66.9 CLASS 1 OBESITY IN ADULT: Status: ACTIVE | Noted: 2023-09-19

## 2023-09-19 PROBLEM — G47.33 OBSTRUCTIVE SLEEP APNEA: Status: ACTIVE | Noted: 2023-09-19

## 2023-09-22 NOTE — PROGRESS NOTES
"Subjective     Patient ID: Michele Chi Jr. is a 53 y.o. male.    Chief Complaint: No chief complaint on file.    HPI  51 year old male with PMH of HTN, WM on CPAP and GERD presents for evaluation of elevated left alex-diaphragm here today for 2 week follow-up from left hemidiaphragm plication. Tolerated procedure well. Uncomplicated post-operative course. Today patient reports "tight, ball-like sensation" as well as sharp, burning-stinging pain along incision site(s). Otherwise doing well. Denies fever, chills, palpitations, syncope, dizziness.       Review of Systems   Constitutional:  Negative for chills, diaphoresis, fatigue, fever and unexpected weight change.   Respiratory:  Positive for cough (dry cough). Negative for shortness of breath, wheezing and stridor.    Cardiovascular: Negative.  Negative for chest pain, palpitations, leg swelling and claudication.   Integumentary:  Negative.   Neurological: Negative.  Negative for dizziness, syncope, weakness and light-headedness.   Hematological: Negative.    Psychiatric/Behavioral: Negative.            Vitals:    09/29/23 0935   BP: 113/80   Pulse: 77   SpO2: 97%   Weight: 119.3 kg (263 lb)   Height: 6' 1" (1.854 m)       Objective     Physical Exam  Constitutional:       Appearance: Normal appearance. He is obese.   Eyes:      Extraocular Movements: Extraocular movements intact.   Cardiovascular:      Rate and Rhythm: Normal rate and regular rhythm.      Pulses: Normal pulses.   Pulmonary:      Effort: Pulmonary effort is normal.      Breath sounds: Normal breath sounds.   Abdominal:      General: Abdomen is flat.      Palpations: Abdomen is soft.   Skin:     General: Skin is warm and dry.      Comments: Incision(s) healing well.    Neurological:      General: No focal deficit present.      Mental Status: He is alert and oriented to person, place, and time. Mental status is at baseline.   Psychiatric:         Mood and Affect: Mood normal.         " Behavior: Behavior normal.         Thought Content: Thought content normal.         CXR 09/29/23: Heart size normal.  Subsegmental atelectatic changes identified at the lung bases more on the left side similar to the previous study.  The upper lung fields are clear.  No pleural effusion.  Postsurgical changes noted in the cervical spine as before      Assessment and Plan     51 year old male with PMH of HTN, WM on CPAP and GERD presents for evaluation of elevated left alex-diaphragm here today for 2 week follow-up from left hemidiaphragm plication.    Plan:     RTC in 6 months with PFTs

## 2023-09-25 ENCOUNTER — TELEPHONE (OUTPATIENT)
Dept: CARDIOTHORACIC SURGERY | Facility: CLINIC | Age: 53
End: 2023-09-25
Payer: COMMERCIAL

## 2023-09-25 NOTE — TELEPHONE ENCOUNTER
Called and spoke to patient regarding post op pain. Patient describes pain as a burning, pulled muscle sensation that happens while standing and laying down and sometimes as an eight with activity. Spoke to provider and reviewed patients medications. Provider ordered to increase gabapentin to twice daily ( 1 am and 1 pm), continue muscle relaxer four times daily and oxycodone as needed. Patient is taking tylenol and instructed to alternate between the tylenol and ibuprofen. Patient given direct line to call if pain worsens or gabapentin does not help. He has a post op appointment on Friday of this week. Patient verbalized understanding of medication changes and to call if any questions/concerns.

## 2023-09-29 ENCOUNTER — OFFICE VISIT (OUTPATIENT)
Dept: CARDIOTHORACIC SURGERY | Facility: CLINIC | Age: 53
End: 2023-09-29
Payer: COMMERCIAL

## 2023-09-29 ENCOUNTER — HOSPITAL ENCOUNTER (OUTPATIENT)
Dept: RADIOLOGY | Facility: HOSPITAL | Age: 53
Discharge: HOME | End: 2023-09-29
Payer: COMMERCIAL

## 2023-09-29 VITALS
DIASTOLIC BLOOD PRESSURE: 80 MMHG | WEIGHT: 263 LBS | SYSTOLIC BLOOD PRESSURE: 113 MMHG | HEIGHT: 73 IN | BODY MASS INDEX: 34.85 KG/M2 | OXYGEN SATURATION: 97 % | HEART RATE: 77 BPM

## 2023-09-29 DIAGNOSIS — J98.6 DIAPHRAGMATIC PARALYSIS: ICD-10-CM

## 2023-09-29 DIAGNOSIS — J98.6 ELEVATED DIAPHRAGM: Primary | ICD-10-CM

## 2023-09-29 DIAGNOSIS — J98.6 ELEVATED DIAPHRAGM: ICD-10-CM

## 2023-09-29 PROCEDURE — 4010F ACE/ARB THERAPY RXD/TAKEN: CPT | Mod: CPTII,S$GLB,, | Performed by: THORACIC SURGERY (CARDIOTHORACIC VASCULAR SURGERY)

## 2023-09-29 PROCEDURE — 3008F PR BODY MASS INDEX (BMI) DOCUMENTED: ICD-10-PCS | Mod: CPTII,S$GLB,, | Performed by: THORACIC SURGERY (CARDIOTHORACIC VASCULAR SURGERY)

## 2023-09-29 PROCEDURE — 3074F PR MOST RECENT SYSTOLIC BLOOD PRESSURE < 130 MM HG: ICD-10-PCS | Mod: CPTII,S$GLB,, | Performed by: THORACIC SURGERY (CARDIOTHORACIC VASCULAR SURGERY)

## 2023-09-29 PROCEDURE — 4010F PR ACE/ARB THEARPY RXD/TAKEN: ICD-10-PCS | Mod: CPTII,S$GLB,, | Performed by: THORACIC SURGERY (CARDIOTHORACIC VASCULAR SURGERY)

## 2023-09-29 PROCEDURE — 71046 X-RAY EXAM CHEST 2 VIEWS: CPT | Mod: TC

## 2023-09-29 PROCEDURE — 99024 PR POST-OP FOLLOW-UP VISIT: ICD-10-PCS | Mod: S$GLB,,, | Performed by: THORACIC SURGERY (CARDIOTHORACIC VASCULAR SURGERY)

## 2023-09-29 PROCEDURE — 99999 PR PBB SHADOW E&M-EST. PATIENT-LVL III: CPT | Mod: PBBFAC,,, | Performed by: THORACIC SURGERY (CARDIOTHORACIC VASCULAR SURGERY)

## 2023-09-29 PROCEDURE — 3008F BODY MASS INDEX DOCD: CPT | Mod: CPTII,S$GLB,, | Performed by: THORACIC SURGERY (CARDIOTHORACIC VASCULAR SURGERY)

## 2023-09-29 PROCEDURE — 99999 PR PBB SHADOW E&M-EST. PATIENT-LVL III: ICD-10-PCS | Mod: PBBFAC,,, | Performed by: THORACIC SURGERY (CARDIOTHORACIC VASCULAR SURGERY)

## 2023-09-29 PROCEDURE — 99024 POSTOP FOLLOW-UP VISIT: CPT | Mod: S$GLB,,, | Performed by: THORACIC SURGERY (CARDIOTHORACIC VASCULAR SURGERY)

## 2023-09-29 PROCEDURE — 1159F PR MEDICATION LIST DOCUMENTED IN MEDICAL RECORD: ICD-10-PCS | Mod: CPTII,S$GLB,, | Performed by: THORACIC SURGERY (CARDIOTHORACIC VASCULAR SURGERY)

## 2023-09-29 PROCEDURE — 3074F SYST BP LT 130 MM HG: CPT | Mod: CPTII,S$GLB,, | Performed by: THORACIC SURGERY (CARDIOTHORACIC VASCULAR SURGERY)

## 2023-09-29 PROCEDURE — 3079F PR MOST RECENT DIASTOLIC BLOOD PRESSURE 80-89 MM HG: ICD-10-PCS | Mod: CPTII,S$GLB,, | Performed by: THORACIC SURGERY (CARDIOTHORACIC VASCULAR SURGERY)

## 2023-09-29 PROCEDURE — 1159F MED LIST DOCD IN RCRD: CPT | Mod: CPTII,S$GLB,, | Performed by: THORACIC SURGERY (CARDIOTHORACIC VASCULAR SURGERY)

## 2023-09-29 PROCEDURE — 71046 XR CHEST PA AND LATERAL: ICD-10-PCS | Mod: 26,,, | Performed by: RADIOLOGY

## 2023-09-29 PROCEDURE — 71046 X-RAY EXAM CHEST 2 VIEWS: CPT | Mod: 26,,, | Performed by: RADIOLOGY

## 2023-09-29 PROCEDURE — 3079F DIAST BP 80-89 MM HG: CPT | Mod: CPTII,S$GLB,, | Performed by: THORACIC SURGERY (CARDIOTHORACIC VASCULAR SURGERY)

## 2023-09-29 RX ORDER — METHOCARBAMOL 500 MG/1
500 TABLET, FILM COATED ORAL 4 TIMES DAILY
Qty: 40 TABLET | Refills: 0 | Status: SHIPPED | OUTPATIENT
Start: 2023-09-29 | End: 2023-10-09

## 2023-10-30 ENCOUNTER — PATIENT MESSAGE (OUTPATIENT)
Dept: CARDIOTHORACIC SURGERY | Facility: CLINIC | Age: 53
End: 2023-10-30
Payer: COMMERCIAL

## 2023-12-12 ENCOUNTER — PATIENT MESSAGE (OUTPATIENT)
Dept: CARDIOTHORACIC SURGERY | Facility: CLINIC | Age: 53
End: 2023-12-12
Payer: COMMERCIAL

## 2023-12-15 DIAGNOSIS — G47.33 OBSTRUCTIVE SLEEP APNEA (ADULT) (PEDIATRIC): Primary | ICD-10-CM

## 2023-12-17 ENCOUNTER — HOSPITAL ENCOUNTER (OUTPATIENT)
Dept: SLEEP MEDICINE | Facility: HOSPITAL | Age: 53
Discharge: HOME | End: 2023-12-17
Attending: INTERNAL MEDICINE
Payer: COMMERCIAL

## 2023-12-17 DIAGNOSIS — G47.33 OBSTRUCTIVE SLEEP APNEA (ADULT) (PEDIATRIC): ICD-10-CM

## 2023-12-17 PROCEDURE — 95806 SLEEP STUDY UNATT&RESP EFFT: CPT

## 2023-12-20 PROBLEM — G47.33 OBSTRUCTIVE SLEEP APNEA (ADULT) (PEDIATRIC): Status: ACTIVE | Noted: 2023-12-20

## 2024-01-02 PROBLEM — R06.09 DYSPNEA ON EXERTION: Status: ACTIVE | Noted: 2024-01-02

## 2024-01-04 PROBLEM — R07.89 ATYPICAL CHEST PAIN: Status: ACTIVE | Noted: 2024-01-04

## 2024-01-04 PROBLEM — I10 HYPERTENSION: Status: ACTIVE | Noted: 2024-01-04

## 2024-01-17 PROBLEM — E66.9 CLASS 1 OBESITY IN ADULT: Status: RESOLVED | Noted: 2023-09-19 | Resolved: 2024-01-17

## 2024-01-17 PROBLEM — E66.811 CLASS 1 OBESITY IN ADULT: Status: RESOLVED | Noted: 2023-09-19 | Resolved: 2024-01-17

## 2024-03-11 DIAGNOSIS — J98.6 ELEVATED DIAPHRAGM: Primary | ICD-10-CM

## 2024-03-27 ENCOUNTER — PATIENT MESSAGE (OUTPATIENT)
Dept: CARDIOTHORACIC SURGERY | Facility: CLINIC | Age: 54
End: 2024-03-27
Payer: COMMERCIAL

## 2024-04-02 NOTE — PROGRESS NOTES
"Subjective     Patient ID: Michele Chi Jr. is a 53 y.o. male.    Chief Complaint: Follow-up    HPI  51 year old male with PMH of HTN, WM on CPAP and GERD presents for 6 month follow-up from left hemidiaphragm plication. Tolerated procedure well. Uncomplicated post-operative course. Today patient reports "tight, ball-like sensation" as well as sharp, burning-stinging pain along incision site(s). Otherwise doing well. Denies fever, chills, palpitations, syncope, dizziness.       Review of Systems   Constitutional:  Negative for chills, diaphoresis, fatigue, fever and unexpected weight change.   Respiratory:  Positive for cough (dry cough). Negative for shortness of breath, wheezing and stridor.    Cardiovascular: Negative.  Negative for chest pain, palpitations, leg swelling and claudication.   Integumentary:  Negative.   Neurological: Negative.  Negative for dizziness, syncope, weakness and light-headedness.   Hematological: Negative.    Psychiatric/Behavioral: Negative.            Vitals:    04/03/24 0958   BP: (!) 142/88   SpO2: 97%   Weight: 117.5 kg (259 lb 0.7 oz)   Height: 6' 1" (1.854 m)   PainSc: 0-No pain         Objective     Physical Exam  Constitutional:       Appearance: Normal appearance. He is obese.   Eyes:      Extraocular Movements: Extraocular movements intact.   Cardiovascular:      Rate and Rhythm: Normal rate and regular rhythm.      Pulses: Normal pulses.   Pulmonary:      Effort: Pulmonary effort is normal.      Breath sounds: Normal breath sounds.   Abdominal:      General: Abdomen is flat.      Palpations: Abdomen is soft.   Skin:     General: Skin is warm and dry.      Comments: Incision(s) healing well.    Neurological:      General: No focal deficit present.      Mental Status: He is alert and oriented to person, place, and time. Mental status is at baseline.   Psychiatric:         Mood and Affect: Mood normal.         Behavior: Behavior normal.         Thought Content: Thought " content normal.       PFTs 12/22/23: FEV1 4.18 67% DLCO 72%    CXR 09/29/23: Heart size normal.  Subsegmental atelectatic changes identified at the lung bases more on the left side similar to the previous study.  The upper lung fields are clear.  No pleural effusion.  Postsurgical changes noted in the cervical spine as before     PFTs 04/03/24: FEV1 2.8 67.6% DLCO 22.34 68%      Assessment and Plan     51 year old male with PMH of HTN, WM on CPAP and GERD presents for evaluation of elevated left alex-diaphragm here today for 6 month follow-up from left hemidiaphragm plication.  Persistent exertional dyspnea confirms my initial suspicion that the patient's exertional dyspnea is multifactorial and that plication would not resolve his symptoms    Plan:     Order  right-sided SNIFF and referral to Orthopedic Surgery for 2nd opinion evaluation of cervical spine.

## 2024-04-03 ENCOUNTER — TELEPHONE (OUTPATIENT)
Dept: ORTHOPEDICS | Facility: CLINIC | Age: 54
End: 2024-04-03
Payer: COMMERCIAL

## 2024-04-03 ENCOUNTER — OFFICE VISIT (OUTPATIENT)
Dept: CARDIOTHORACIC SURGERY | Facility: CLINIC | Age: 54
End: 2024-04-03
Payer: COMMERCIAL

## 2024-04-03 ENCOUNTER — HOSPITAL ENCOUNTER (OUTPATIENT)
Dept: PULMONOLOGY | Facility: CLINIC | Age: 54
Discharge: HOME | End: 2024-04-03
Payer: COMMERCIAL

## 2024-04-03 VITALS
SYSTOLIC BLOOD PRESSURE: 142 MMHG | HEIGHT: 73 IN | OXYGEN SATURATION: 97 % | DIASTOLIC BLOOD PRESSURE: 88 MMHG | HEART RATE: 100 BPM | BODY MASS INDEX: 34.33 KG/M2 | WEIGHT: 259.06 LBS

## 2024-04-03 DIAGNOSIS — M54.2 CERVICAL PAIN (NECK): Primary | ICD-10-CM

## 2024-04-03 DIAGNOSIS — J98.6 DIAPHRAGMATIC PARALYSIS: ICD-10-CM

## 2024-04-03 DIAGNOSIS — G56.80 PHRENIC NERVE PALSY: ICD-10-CM

## 2024-04-03 DIAGNOSIS — Z98.1 S/P CERVICAL SPINAL FUSION: ICD-10-CM

## 2024-04-03 DIAGNOSIS — J98.6 ELEVATED DIAPHRAGM: Primary | ICD-10-CM

## 2024-04-03 DIAGNOSIS — R06.09 EXERTIONAL DYSPNEA: ICD-10-CM

## 2024-04-03 DIAGNOSIS — J98.6 ELEVATED DIAPHRAGM: ICD-10-CM

## 2024-04-03 DIAGNOSIS — M54.2 CERVICAL SPINE PAIN: ICD-10-CM

## 2024-04-03 PROCEDURE — 94729 DIFFUSING CAPACITY: CPT | Mod: S$GLB,,, | Performed by: INTERNAL MEDICINE

## 2024-04-03 PROCEDURE — 94727 GAS DIL/WSHOT DETER LNG VOL: CPT | Mod: S$GLB,,, | Performed by: INTERNAL MEDICINE

## 2024-04-03 PROCEDURE — 99999 PR PBB SHADOW E&M-EST. PATIENT-LVL III: CPT | Mod: PBBFAC,,, | Performed by: THORACIC SURGERY (CARDIOTHORACIC VASCULAR SURGERY)

## 2024-04-03 PROCEDURE — 94060 EVALUATION OF WHEEZING: CPT | Mod: S$GLB,,, | Performed by: INTERNAL MEDICINE

## 2024-04-03 PROCEDURE — 1159F MED LIST DOCD IN RCRD: CPT | Mod: CPTII,S$GLB,, | Performed by: THORACIC SURGERY (CARDIOTHORACIC VASCULAR SURGERY)

## 2024-04-03 PROCEDURE — 3008F BODY MASS INDEX DOCD: CPT | Mod: CPTII,S$GLB,, | Performed by: THORACIC SURGERY (CARDIOTHORACIC VASCULAR SURGERY)

## 2024-04-03 PROCEDURE — 4010F ACE/ARB THERAPY RXD/TAKEN: CPT | Mod: CPTII,S$GLB,, | Performed by: THORACIC SURGERY (CARDIOTHORACIC VASCULAR SURGERY)

## 2024-04-03 PROCEDURE — 3079F DIAST BP 80-89 MM HG: CPT | Mod: CPTII,S$GLB,, | Performed by: THORACIC SURGERY (CARDIOTHORACIC VASCULAR SURGERY)

## 2024-04-03 PROCEDURE — 3077F SYST BP >= 140 MM HG: CPT | Mod: CPTII,S$GLB,, | Performed by: THORACIC SURGERY (CARDIOTHORACIC VASCULAR SURGERY)

## 2024-04-03 PROCEDURE — 99213 OFFICE O/P EST LOW 20 MIN: CPT | Mod: S$GLB,,, | Performed by: THORACIC SURGERY (CARDIOTHORACIC VASCULAR SURGERY)

## 2024-04-03 NOTE — TELEPHONE ENCOUNTER
Called pt and made appt with Dr. Costello. Pt states he had a fusion on C4 and C7. He also stated that he saw another doctor and they said his C3 & C4 are in bad condition.

## 2024-04-03 NOTE — TELEPHONE ENCOUNTER
----- Message from Jeanne Leal RN sent at 4/3/2024 11:19 AM CDT -----  Regarding: Referral  Good Morning,    Dr Knight saw Mr Chi this morning and he is wanting to refer for C-spine problems. Patient has had a procedure in the past. Patient prefers Friday afternoons if possible.    Thanks,  Jeanne

## 2024-04-05 ENCOUNTER — PATIENT MESSAGE (OUTPATIENT)
Dept: CARDIOTHORACIC SURGERY | Facility: CLINIC | Age: 54
End: 2024-04-05
Payer: COMMERCIAL

## 2024-04-08 ENCOUNTER — OFFICE VISIT (OUTPATIENT)
Dept: ORTHOPEDICS | Facility: CLINIC | Age: 54
End: 2024-04-08
Payer: COMMERCIAL

## 2024-04-08 ENCOUNTER — HOSPITAL ENCOUNTER (OUTPATIENT)
Dept: RADIOLOGY | Facility: HOSPITAL | Age: 54
Discharge: HOME | End: 2024-04-08
Attending: ORTHOPAEDIC SURGERY
Payer: COMMERCIAL

## 2024-04-08 DIAGNOSIS — M50.30 DDD (DEGENERATIVE DISC DISEASE), CERVICAL: ICD-10-CM

## 2024-04-08 DIAGNOSIS — M54.2 CERVICAL PAIN (NECK): ICD-10-CM

## 2024-04-08 DIAGNOSIS — Z98.1 S/P CERVICAL SPINAL FUSION: ICD-10-CM

## 2024-04-08 DIAGNOSIS — M48.02 SPINAL STENOSIS, CERVICAL REGION: ICD-10-CM

## 2024-04-08 DIAGNOSIS — G56.80 PHRENIC NERVE PALSY: ICD-10-CM

## 2024-04-08 DIAGNOSIS — Z98.1 HISTORY OF FUSION OF CERVICAL SPINE: ICD-10-CM

## 2024-04-08 DIAGNOSIS — M47.812 CERVICAL SPONDYLOSIS: Primary | ICD-10-CM

## 2024-04-08 DIAGNOSIS — M54.12 CERVICAL RADICULOPATHY: ICD-10-CM

## 2024-04-08 PROCEDURE — 1160F RVW MEDS BY RX/DR IN RCRD: CPT | Mod: CPTII,S$GLB,, | Performed by: ORTHOPAEDIC SURGERY

## 2024-04-08 PROCEDURE — 72040 X-RAY EXAM NECK SPINE 2-3 VW: CPT | Mod: 26,,, | Performed by: RADIOLOGY

## 2024-04-08 PROCEDURE — 99999 PR PBB SHADOW E&M-EST. PATIENT-LVL III: CPT | Mod: PBBFAC,,, | Performed by: ORTHOPAEDIC SURGERY

## 2024-04-08 PROCEDURE — 1159F MED LIST DOCD IN RCRD: CPT | Mod: CPTII,S$GLB,, | Performed by: ORTHOPAEDIC SURGERY

## 2024-04-08 PROCEDURE — 4010F ACE/ARB THERAPY RXD/TAKEN: CPT | Mod: CPTII,S$GLB,, | Performed by: ORTHOPAEDIC SURGERY

## 2024-04-08 PROCEDURE — 72040 X-RAY EXAM NECK SPINE 2-3 VW: CPT | Mod: TC

## 2024-04-08 PROCEDURE — 99204 OFFICE O/P NEW MOD 45 MIN: CPT | Mod: S$GLB,,, | Performed by: ORTHOPAEDIC SURGERY

## 2024-04-08 NOTE — PROGRESS NOTES
DATE: 4/8/2024  PATIENT: Michele Chi Jr.    Attending Physician: Earnest Costello M.D.    CHIEF COMPLAINT: neck and LUE pain    HISTORY:  Michele Chi Jr. is a 53 y.o. male w/ a hx of C4-7 ACDF in Oct 2022 for phrenic nerve issues (SOB) presents for initial evaluation of neck and left arm pain (Neck - 5, Arm - 5). The pain has been present for years. The patient describes the pain as dull and it radiates down LUE to the fingers. The pain is worse with activity and improved by rest. There is LUE associated numbness and tingling. There is RUE subjective weakness. Prior treatments have included OTC meds, PT, but no TELLY or surgery.     The Patient denies myelopathic symptoms such as handwriting changes or difficulty with buttons/coins/keys. Denies perineal paresthesias, bowel/bladder dysfunction.    The patient does not smoke, have DM or endorse IVDU. The patient is not on any blood thinners and does not take chronic narcotics. He works as an .    PAST MEDICAL/SURGICAL HISTORY:  Past Medical History:   Diagnosis Date    Gastritis     GERD (gastroesophageal reflux disease)     Hypertension     Internal hemorrhoids     Restless leg syndrome      Past Surgical History:   Procedure Laterality Date    APPENDECTOMY      ARTHROSCOPY OF KNEE Left     CERVICAL FUSION      COLONOSCOPY N/A 8/30/2023    Procedure: COLONOSCOPY;  Surgeon: Yuriy Wilburn MD;  Location: [facility];  Service: Endoscopy;  Laterality: N/A;    ESOPHAGOGASTRODUODENOSCOPY N/A 05/14/2021    Procedure: EGD (ESOPHAGOGASTRODUODENOSCOPY);  Surgeon: Yuriy Wilburn MD;  Location: [facility];  Service: Endoscopy;  Laterality: N/A;    HEMORRHOID SURGERY      INJECTION OF ANESTHETIC AGENT AROUND MULTIPLE INTERCOSTAL NERVES Left 9/14/2023    Procedure: BLOCK, NERVE, INTERCOSTAL, 2 OR MORE;  Surgeon: Ponce Knight MD;  Location: [address];  Service: Thoracic;  Laterality: Left;    ROBOT-ASSISTED LAPAROSCOPIC PLICATION OF  DIAPHRAGM USING DA DOREEN XI Left 9/14/2023    Procedure: XI ROBOTIC PLICATION, DIAPHRAGM;  Surgeon: Ponce Knight MD;  Location: [facility] OR [address] [place] [address];  Service: Thoracic;  Laterality: Left;    TONSILLECTOMY      addenoidectomy    TYMPANOSTOMY TUBE PLACEMENT         Current Medications: No current outpatient medications on file.  No current facility-administered medications for this visit.    Facility-Administered Medications Ordered in Other Visits:     0.9%  NaCl infusion, , Intravenous, Continuous, Yuriy Wilburn MD, Stopped at 05/14/21 1047    0.9%  NaCl infusion, , Intravenous, Continuous, Yuriy Wilburn MD, Stopped at 08/30/23 1145    Social History:   Social History     Socioeconomic History    Marital status:    Tobacco Use    Smoking status: Never    Smokeless tobacco: Never   Substance and Sexual Activity    Alcohol use: Never    Drug use: Never    Sexual activity: Yes     Social Determinants of Health     Financial Resource Strain: High Risk (4/2/2024)    Overall Financial Resource Strain (CARDIA)     Difficulty of Paying Living Expenses: Very hard   Food Insecurity: Food Insecurity Present (4/2/2024)    Hunger Vital Sign     Worried About Running Out of Food in the Last Year: Sometimes true     Ran Out of Food in the Last Year: Patient declined   Transportation Needs: Patient Declined (4/2/2024)    PRAPARE - Transportation     Lack of Transportation (Medical): Patient declined     Lack of Transportation (Non-Medical): Patient declined   Physical Activity: Sufficiently Active (4/2/2024)    Exercise Vital Sign     Days of Exercise per Week: 5 days     Minutes of Exercise per Session: 30 min   Stress: Stress Concern Present (4/2/2024)    [employer] - Occupational Stress Questionnaire     Feeling of Stress : Very much   Social Connections: Unknown (4/2/2024)    Social Connection and Isolation Panel [NHANES]     Frequency of Communication with Friends and  Family: More than three times a week     Frequency of Social Gatherings with Friends and Family: Twice a week     Active Member of Clubs or Organizations: Yes     Attends Club or Organization Meetings: Never     Marital Status:    Housing Stability: Patient Declined (4/2/2024)    Housing Stability Vital Sign     Unable to Pay for Housing in the Last Year: Patient declined     Number of Places Lived in the Last Year: 1     Unstable Housing in the Last Year: Patient declined       REVIEW OF SYSTEMS:  Constitution: Negative. Negative for chills, fever and night sweats.   Cardiovascular: Negative for chest pain and syncope.   Respiratory: Negative for cough and shortness of breath.   Gastrointestinal: See HPI. Negative for nausea/vomiting. Negative for abdominal pain.  Genitourinary: See HPI. Negative for discoloration or dysuria.  Skin: Negative for dry skin, itching and rash.   Hematologic/Lymphatic: negative for bleeding/clotting disorders.   Musculoskeletal: Negative for falls and muscle weakness.   Neurological: See HPI. no history of seizures. no history of cranial surgery or shunts.  Endocrine: Negative for polydipsia, polyphagia and polyuria.   Allergic/Immunologic: Negative for hives and persistent infections.  Psychiatric/Behavioral: Negative for depression and insomnia.         EXAM:  There were no vitals taken for this visit.    General: The patient is a 53 y.o. male in no apparent distress, the patient is orientatied to person, place and time.  Psych: Normal mood and affect  HEENT: Vision grossly intact, hearing intact to the spoken word.  Lungs: Respirations unlabored.  Gait: Normal station and gait, no difficulty with toe or heel walk.   Skin: Cervical skin negative for rashes, lesions, hairy patches and surgical scars.  Range of motion: Cervical range of motion is acceptable. There is posterior cervical tenderness to palpation.  Spinal Balance: Global saggital and coronal spinal balance  "acceptable, no significant for scoliosis and kyphosis.  Musculoskeletal: No pain with the range of motion of the bilateral shoulders and elbows. Normal bulk and contour of the bilateral hands.  Vascular: Bilateral hands warm and well perfused, Radial pulses 2+ bilaterally.  Neurological: Normal strength and tone in all major motor groups in the bilateral upper and lower extremities. Normal sensation to light touch in the C5-T1 and L2-S1 dermatomes bilaterally.  Deep tendon reflexes symmetric 2+ in the bilateral upper and lower extremities.  Negative Inverted Radial Reflex and Azevedo's bilaterally. Negative Babinski bilaterally.     IMAGING:   Today I independently reviewed the following images and my interpretations are as follows:    AP, Lat and Flex/Ex  upright C-spine films demonstrate spondylosis and DDD. C4-7 ACDF.    MRI cervical showed mod b/l C3-4, R C4-6 and mod-severe b/l C6-7 foraminal stenosis. No significant central stenosis.     There is no height or weight on file to calculate BMI.  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    Patient has cervical spondylosis and stenosis with LUE radiculopathy, in the setting of prior C4-7 ACDF. I discussed the natural history of their diagnoses as well as surgical and nonsurgical treatment options. I educated the patient on the importance of core/back strengthening, correct posture, bending/lifting ergonomics, and low-impact aerobic exercises (walking, elliptical, and aquatherapy). Continue medications. I ordered CT cervical to evaluate fusion. Patient will follow up in 2 weeks for CT " review.    Earnest Costello MD  Orthopaedic Spine Surgeon  Department of Orthopaedic Surgery  [phone]

## 2024-04-17 ENCOUNTER — PATIENT MESSAGE (OUTPATIENT)
Dept: ORTHOPEDICS | Facility: CLINIC | Age: 54
End: 2024-04-17
Payer: COMMERCIAL

## 2024-04-19 ENCOUNTER — HOSPITAL ENCOUNTER (OUTPATIENT)
Dept: RADIOLOGY | Facility: HOSPITAL | Age: 54
Discharge: HOME | End: 2024-04-19
Attending: ORTHOPAEDIC SURGERY
Payer: COMMERCIAL

## 2024-04-19 DIAGNOSIS — M48.02 SPINAL STENOSIS, CERVICAL REGION: ICD-10-CM

## 2024-04-19 PROCEDURE — 72125 CT NECK SPINE W/O DYE: CPT | Mod: 26,,, | Performed by: RADIOLOGY

## 2024-04-19 PROCEDURE — 72125 CT NECK SPINE W/O DYE: CPT | Mod: TC

## 2024-04-23 ENCOUNTER — OFFICE VISIT (OUTPATIENT)
Dept: ORTHOPEDICS | Facility: CLINIC | Age: 54
End: 2024-04-23
Attending: ORTHOPAEDIC SURGERY
Payer: COMMERCIAL

## 2024-04-23 VITALS — WEIGHT: 257.25 LBS | BODY MASS INDEX: 33.94 KG/M2

## 2024-04-23 DIAGNOSIS — M50.30 DDD (DEGENERATIVE DISC DISEASE), CERVICAL: ICD-10-CM

## 2024-04-23 DIAGNOSIS — M54.12 CERVICAL RADICULOPATHY: ICD-10-CM

## 2024-04-23 DIAGNOSIS — M47.812 CERVICAL SPONDYLOSIS: ICD-10-CM

## 2024-04-23 DIAGNOSIS — Z98.1 HISTORY OF FUSION OF CERVICAL SPINE: Primary | ICD-10-CM

## 2024-04-23 PROCEDURE — 1160F RVW MEDS BY RX/DR IN RCRD: CPT | Mod: CPTII,S$GLB,, | Performed by: ORTHOPAEDIC SURGERY

## 2024-04-23 PROCEDURE — 3008F BODY MASS INDEX DOCD: CPT | Mod: CPTII,S$GLB,, | Performed by: ORTHOPAEDIC SURGERY

## 2024-04-23 PROCEDURE — 1159F MED LIST DOCD IN RCRD: CPT | Mod: CPTII,S$GLB,, | Performed by: ORTHOPAEDIC SURGERY

## 2024-04-23 PROCEDURE — 99214 OFFICE O/P EST MOD 30 MIN: CPT | Mod: S$GLB,,, | Performed by: ORTHOPAEDIC SURGERY

## 2024-04-23 PROCEDURE — 99999 PR PBB SHADOW E&M-EST. PATIENT-LVL III: CPT | Mod: PBBFAC,,, | Performed by: ORTHOPAEDIC SURGERY

## 2024-04-23 PROCEDURE — 4010F ACE/ARB THERAPY RXD/TAKEN: CPT | Mod: CPTII,S$GLB,, | Performed by: ORTHOPAEDIC SURGERY

## 2024-04-24 NOTE — PROGRESS NOTES
DATE: 4/23/2024  PATIENT: Michele Chi Jr.    Attending Physician: Earnest Costello M.D.    HISTORY:  Michele Chi Jr. is a 53 y.o. male w/ a hx of C4-7 ACDF in Oct 2022 for phrenic nerve issues (SOB) who returns to me today for CT review. Patient continues to have neck pain that radiates down LUE to the fingers. He has been taking meds without much relief.    The Patient denies myelopathic symptoms such as handwriting changes or difficulty with buttons/coins/keys. Denies perineal paresthesias, bowel/bladder dysfunction.    The patient does not smoke, have DM or endorse IVDU. The patient is not on any blood thinners and does not take chronic narcotics. He works as an .    PMH/PSH/FamHx/SocHx:  Unchanged from prior visit    ROS:  Positive for neck and LUE pain  Denies myelopathic symptoms, perineal paresthesias, bowel or bladder incontinence    EXAM:  Wt 116.7 kg (257 lb 4.4 oz)   BMI 33.94 kg/m²     My physical examination was notable for the following findings: motor intact BUE; SILT    IMAGING:  Today I independently reviewed the following images and my interpretations are as follows:    Previous AP and Lat upright C-spine films showed spondylosis and DDD. C4-7 ACDF.    MRI cervical showed mod b/l C3-4, R C4-6 and mod-severe b/l C6-7 foraminal stenosis. No significant central stenosis.     CT cervical showed osseous fusion C4-7.    ASSESSMENT/PLAN:  Patient has cervical spondylosis and stenosis with LUE radiculopathy, in the setting of prior C4-7 ACDF. I discussed the natural history of their diagnoses as well as surgical and nonsurgical treatment options. I educated the patient on the importance of core/back strengthening, correct posture, bending/lifting ergonomics, and low-impact aerobic exercises (walking, elliptical, and aquatherapy). Continue medications. I referred pt to Pain Mgmt for possible injections. Patient will follow up in 6 weeks for re-evaluation. Patient is a candidate  for C3-T1 PCDF if he fails conservative management.    Earnest Costello MD  Orthopaedic Spine Surgeon  Department of Orthopaedic Surgery  [phone]

## 2024-04-25 ENCOUNTER — PATIENT MESSAGE (OUTPATIENT)
Dept: ORTHOPEDICS | Facility: CLINIC | Age: 54
End: 2024-04-25
Payer: COMMERCIAL

## 2024-04-25 NOTE — PROGRESS NOTES
[facility] - New Patient Evaluation    Referred by: Dr. Earnest Costello   Reason for referral: Cervical spondylosis     CC:   Chief Complaint   Patient presents with    Neck Pain         4/26/2024     1:32 PM   Last 3 PDI Scores   Pain Disability Index (PDI) 54       Subjective 04/26/2024:   Michele Chi Jr. is a 53 y.o. male who presents complaining of neck pain.  Pain is localized to the upper cervical spine and is worse on the left. Pain will radiate into the upper extremities, more so on the left.  He has a history of C4 through 7 anterior cervical fusion in 10/2022.  MRI of the cervical spine she was varying degrees foraminal stenosis as well as facet arthropathy in the upper levels of the C-spine.  He was evaluated by Dr. Costello with orthopedic spine surgery who recommends he be evaluated for pain procedures prior to additional surgery at this time.  He was completed physical therapy in his compliant with a home exercise regimen.  He was prescribed gabapentin in the past but did not find it helpful.  He has not tried epidural steroid injections or medial branch blocks/facet injections.    Initial Pain Assessment:  Pain Assessment  Pain Score: 8  Pain Location: Neck  Pain Orientation: Left  Pain Radiating Towards: neck, shoulder, arms  Pain Descriptors: Aching, Throbbing, Tightness, Tingling, Sharp  Pain Frequency: Continuous  Pain Onset: On-going  Date Pain First Started:  (3 YEARS)  Clinical Progression: Not changed  Aggravating Factors: Other (Comment) (Movement)  Result of Injury: No  Work-Related Injury: No  Patient's Stated Pain Goal: 8  Pain Intervention(s): Other (Comment) (Nothing)     Patient denies urinary incontinence, bowel incontinence, significant weight loss, significant motor weakness, and loss of sensations.      Previous Interventions:  - None    Previous Therapies:  PT/OT: yes   Chiropractor:   HEP: Yes  Relevant Surgery: yes   10/2022 - C4-C7 ACDF    Previous  Medications:   - NSAIDS:   - Muscle Relaxants:    - TCAs:   - SNRIs:   - Topicals:   - Anticonvulsants: gabapentin did not help   - Opioids:   - Adjuvants:     Current Pain Medications:   None currently     Review of Systems:  ROS    GENERAL:  No weight loss, malaise or fevers.  HEENT:   No recent changes in vision or hearing  NECK:  No difficulty with swallowing. No stridor.   RESPIRATORY:  Negative for cough, wheezing or shortness of breath, patient denies any recent URI.  CARDIOVASCULAR:  Negative for chest pain, leg swelling or palpitations.  GI:  Negative for abdominal discomfort, blood in stools or black stools or change in bowel habits.  MUSCULOSKELETAL:  See HPI.  SKIN:  Negative for lesions, rash, and itching.  PSYCH:  No mood disorder or recent psychosocial stressors.    HEMATOLOGY/LYMPHOLOGY:  Negative for prolonged bleeding, bruising easily or swollen nodes.  Patient is not currently taking any anti-coagulants  NEURO:   No history of headaches, syncope, paralysis, seizures or tremors.  All other reviewed and negative other than HPI.    History:  Current medications, allergies, medical history, surgical history,   family history, and social history were reviewed in the chart as marked.    Full Medication List:    Current Outpatient Medications:     meclizine (ANTIVERT) 25 mg tablet, Take 1 tablet (25 mg total) by mouth 3 (three) times daily as needed for Dizziness., Disp: 60 tablet, Rfl: 2    ondansetron (ZOFRAN-ODT) 4 MG TbDL, Take 1 tablet (4 mg total) by mouth every 6 (six) hours as needed (nausea and vomiting)., Disp: 27 tablet, Rfl: 0    DULoxetine (CYMBALTA) 20 MG capsule, Take 1 capsule (20 mg total) by mouth once daily., Disp: 30 capsule, Rfl: 1  No current facility-administered medications for this visit.    Facility-Administered Medications Ordered in Other Visits:     0.9%  NaCl infusion, , Intravenous, Continuous, Yuriy Wilburn MD, Stopped at 05/14/21 1047    0.9%  NaCl infusion, ,  "Intravenous, Continuous, [place], Yuriy LONG MD, Stopped at 08/30/23 1145     Allergies:  Patient has no known allergies.     Medical History:   has a past medical history of Gastritis, GERD (gastroesophageal reflux disease), Hypertension, Internal hemorrhoids, and Restless leg syndrome.    Surgical History:   has a past surgical history that includes Appendectomy; Arthroscopy of knee (Left); Tonsillectomy; Hemorrhoid surgery; Tympanostomy tube placement; Esophagogastroduodenoscopy (N/A, 05/14/2021); Cervical fusion; Colonoscopy (N/A, 8/30/2023); Robot-assisted laparoscopic plication of diaphragm using da Pam Xi (Left, 9/14/2023); and Injection of anesthetic agent around multiple intercostal nerves (Left, 9/14/2023).    Family History:  family history includes Hypertension in his father.    Social History:   reports that he has never smoked. He has never used smokeless tobacco. He reports that he does not drink alcohol and does not use drugs.    Physical Exam:  Vitals:    04/26/24 1331 04/26/24 1442   BP: (!) 147/82    Pulse: 73    Weight: 117.8 kg (259 lb 13 oz)    Height: 6' 1" (1.854 m)    PainSc:   8   8   PainLoc: Neck        GENERAL: Well appearing, in no acute distress, alert and oriented x3.  PSYCH:  Mood and affect appropriate.  SKIN: Skin color, texture, turgor normal, no rashes or lesions.  HEAD/FACE:  Normocephalic, atraumatic. Cranial nerves grossly intact.  NECK:  Pain is reproduced with range of motion of the cervical spine.  +pain to palpation over the cervical paraspinous muscles on the left.   CV: RRR with palpation of the radial artery.  PULM: No evidence of respiratory difficulty, symmetric chest rise.  GI:  Soft and non-distended.  MSK: No obvious deformities, edema, or skin discoloration.  No atrophy or tone abnormalities are noted.   NEURO: Bilateral upper and lower extremity coordination and strength is symmetric.  No loss of sensation is noted.  MENTAL STATUS: A x O x 3, good " concentration, speech is fluent and goal directed  MOTOR: 5/5 in all muscle groups  GAIT: Normal. Ambulates unassisted.    Imaging:  XR CERVICAL SPINE AP LATERAL     CLINICAL HISTORY:  Cervicalgia     TECHNIQUE:  AP, lateral and open mouth views of the cervical spine were performed.     COMPARISON:  No 02/15/2024 ne.     FINDINGS:  Postoperative changes of ACDF extending from C4 through C7 vertebral segments identified.  The position alignment is satisfactory and unchanged as compared to the previous study        Electronically signed by:Roni Pearson MD  Date:                                            04/08/2024      CT CERVICAL SPINE WITHOUT CONTRAST     CLINICAL HISTORY:  Spinal stenosis, cervical;Spinal stenosis, cervical region     TECHNIQUE:  Low dose axial images, sagittal and coronal reformations were performed though the cervical spine.  Contrast was not administered.     COMPARISON:  Cervical spine radiograph 04/08/2024, MRI cervical spine 02/15/2024     FINDINGS:  Postoperative changes of C4-C7 ACDF with partial osseous fusion across the disc spaces.  No perihardware lucency or fracture.  Alignment anatomic.  No prevertebral soft tissue swelling.     Alignment: Straightening of the normal cervical lordosis.     Vertebrae: No fracture.  No lytic or blastic lesion.     C1-2: Dens is intact.  Pre-dens space is maintained.     Skull base and craniocervical junction: Normal.     Degenerative findings:     There is mild disc height loss/degenerative disc disease with uncovertebral spurring at C3-4 and C7-T1.  There is prominent left-sided facet arthropathy at C2-3, noting joint hypertrophy with subchondral sclerosis/cystic change.  Posterior disc osteophyte complex noted at C3-4.  These findings contribute to mild/moderate spinal canal and neural foraminal stenosis at C3-4, correlating with the 02/15/2024 MRI exam.     C4-C7 is fused, as above.     Paraspinal muscles & soft tissues: Unremarkable.      Impression:     C4-C7 ACDF, as above.  No acute findings.     Cervical spondylosis, as above     Electronically signed by:Arjun Jones MD  Date:                                            04/19/2024      MRI CERVICAL SPINE W WO CONTRAST     CLINICAL HISTORY:  neck pain;  Cervicalgia     TECHNIQUE:  MRI of the cervical spine was performed pre and post IV contrast administration using multiple planes and sequences.     COMPARISON:  Lateral cervical spine radiograph 02/15/2024.     FINDINGS:  Evidence of developmental cervical spinal canal narrowing.     At C2-3, broad-based dorsal disc protrusion with effacement of thecal sac.  No cord impingement.  No foraminal stenosis.     At C3-4, right paracentral disc protrusion/osteophytic ridging resulting in right lateral canal stenosis.  There is broad-based dorsal disc protrusion resulting in mild ventral cord contact.  There is moderate bilateral foraminal stenosis secondary to uncinate hypertrophy and facet arthrosis.     At C4-5, discectomy with anterior fusion.  No cord impingement.  Moderate right foraminal stenosis secondary to uncinate hypertrophy and facet arthrosis.     At C5-6, discectomy with anterior fusion.  No cord impingement.  Moderate right and mild left foraminal stenosis secondary to uncinate hypertrophy and facet arthrosis.     At C6-7, discectomy with anterior fusion.  No cord impingement.  Moderate right and moderate to severe left foraminal stenosis secondary to uncinate hypertrophy and facet arthrosis.     At C7-T1, no disc protrusion or cord impingement.  Mild bilateral foraminal stenosis secondary to uncinate hypertrophy and facet arthrosis.     Spinal cord appears intact with no cord signal abnormality.  Postcontrast images demonstrate no masses or abnormal enhancement.  The paravertebral soft tissues appear unremarkable.     Impression:     1. Evidence of developmental spinal canal narrowing.  2. At C3-4, right paracentral disc  protrusion/osteophytic ridging resulting in right lateral canal stenosis.  Broad-based dorsal disc protrusion with mild ventral cord contact.  Moderate bilateral foraminal stenosis.  3. Discectomy with anterior fusion C4 through C7.  4. Multilevel foraminal stenosis.        Electronically signed by:Arron Maciel MD  Date:                                            02/16/2024    Labs:  BMP  Lab Results   Component Value Date     01/04/2024    K 3.9 01/04/2024     01/04/2024    CO2 23 01/04/2024    BUN 12 01/04/2024    CREATININE 1.11 01/04/2024    CALCIUM 8.8 01/04/2024    ANIONGAP 11 01/04/2024    EGFRNORACEVR >60 01/04/2024     Lab Results   Component Value Date    ALT 36 01/04/2024    AST 34 01/04/2024    ALKPHOS 92 01/04/2024    BILITOT 0.4 01/04/2024     Lab Results   Component Value Date    WBC 10.40 01/04/2024    HGB 13.9 (L) 01/04/2024    HCT 43.2 01/04/2024    MCV 81 (L) 01/04/2024     01/04/2024       Assessment:  Problem List Items Addressed This Visit    None  Visit Diagnoses       Cervical radiculopathy    -  Primary    Relevant Orders    Procedure Order to Pain Management    Cervical spondylosis        DDD (degenerative disc disease), cervical        Relevant Orders    Procedure Order to Pain Management          04/26/2024 - Michele Chi Jr. is a 53 y.o. male who  has a past medical history of Gastritis, GERD (gastroesophageal reflux disease), Hypertension, Internal hemorrhoids, and Restless leg syndrome.  By history and examination this patient has chronic neck pain with radiculopathy.  The underlying cause is facet arthritis, degenerative disc disease, and foraminal stenosis.  Pathology is confirmed by imaging.  He has a history of C4 - C7 ACDF in 2022.   He was referred to us by Neurosurgery for evaluation for interventional procedures.  We discussed the underlying diagnoses and multiple treatment options including non-opioid medications, interventional procedures,  physical therapy, and home exercise.  I will schedule the patient for a cervical epidural steroid injection to help with his radicular symptoms.  Consider upper cervical medial branch blocks/RFA for his upper axial pain.  The risks and benefits of each treatment option were discussed and all questions were answered.      Treatment Plan:   Procedures:  Scheduled for cervical epidural steroid injection at C7/T1.  In the future he may also benefit from upper cervical medial branch blocks/RFA.  PT/OT/HEP: I have stressed the importance of physical activity and a home exercise plan to help with pain and improve health.  Previously completed physical therapy.  Continue with home exercise program.  Medications:    - duloxetine 20 mg qd   -  Reviewed and consistent with medication use as prescribed.  Imaging:  MRI cervical spine, cervical x-rays, and cervical CT were all reviewed and discussed with the patient.  Follow Up: RTC 2 weeks post procedure    Karina Barrios DO   Interventional Pain Medicine / Anesthesiology    Disclaimer: This note was partly generated using dictation software which may occasionally result in transcription errors.

## 2024-04-26 ENCOUNTER — OFFICE VISIT (OUTPATIENT)
Dept: OTOLARYNGOLOGY | Facility: CLINIC | Age: 54
End: 2024-04-26
Payer: COMMERCIAL

## 2024-04-26 ENCOUNTER — OFFICE VISIT (OUTPATIENT)
Dept: PAIN MEDICINE | Facility: CLINIC | Age: 54
End: 2024-04-26
Payer: COMMERCIAL

## 2024-04-26 VITALS
WEIGHT: 259.81 LBS | HEIGHT: 73 IN | HEART RATE: 73 BPM | DIASTOLIC BLOOD PRESSURE: 82 MMHG | BODY MASS INDEX: 34.43 KG/M2 | SYSTOLIC BLOOD PRESSURE: 147 MMHG

## 2024-04-26 VITALS
WEIGHT: 259.25 LBS | HEIGHT: 73 IN | SYSTOLIC BLOOD PRESSURE: 121 MMHG | BODY MASS INDEX: 34.36 KG/M2 | DIASTOLIC BLOOD PRESSURE: 76 MMHG | HEART RATE: 66 BPM

## 2024-04-26 DIAGNOSIS — M47.812 CERVICAL SPONDYLOSIS: ICD-10-CM

## 2024-04-26 DIAGNOSIS — M50.30 DDD (DEGENERATIVE DISC DISEASE), CERVICAL: ICD-10-CM

## 2024-04-26 DIAGNOSIS — R42 EPISODES OF VERTIGO OCCURRING INFREQUENTLY: ICD-10-CM

## 2024-04-26 DIAGNOSIS — H93.8X1 EAR FULLNESS, RIGHT: Primary | ICD-10-CM

## 2024-04-26 DIAGNOSIS — M54.12 CERVICAL RADICULOPATHY: Primary | ICD-10-CM

## 2024-04-26 DIAGNOSIS — Z86.69 HISTORY OF EUSTACHIAN TUBE DYSFUNCTION: ICD-10-CM

## 2024-04-26 DIAGNOSIS — G51.39 FACIAL SPASM: ICD-10-CM

## 2024-04-26 DIAGNOSIS — Z98.890 HISTORY OF MYRINGOTOMY: ICD-10-CM

## 2024-04-26 PROCEDURE — 99999 PR PBB SHADOW E&M-EST. PATIENT-LVL III: CPT | Mod: PBBFAC,,, | Performed by: OTOLARYNGOLOGY

## 2024-04-26 PROCEDURE — 3008F BODY MASS INDEX DOCD: CPT | Mod: CPTII,S$GLB,, | Performed by: OTOLARYNGOLOGY

## 2024-04-26 PROCEDURE — 1159F MED LIST DOCD IN RCRD: CPT | Mod: CPTII,S$GLB,, | Performed by: OTOLARYNGOLOGY

## 2024-04-26 PROCEDURE — 3008F BODY MASS INDEX DOCD: CPT | Mod: CPTII,S$GLB,, | Performed by: STUDENT IN AN ORGANIZED HEALTH CARE EDUCATION/TRAINING PROGRAM

## 2024-04-26 PROCEDURE — 1160F RVW MEDS BY RX/DR IN RCRD: CPT | Mod: CPTII,S$GLB,, | Performed by: OTOLARYNGOLOGY

## 2024-04-26 PROCEDURE — 99204 OFFICE O/P NEW MOD 45 MIN: CPT | Mod: S$GLB,,, | Performed by: OTOLARYNGOLOGY

## 2024-04-26 PROCEDURE — 3078F DIAST BP <80 MM HG: CPT | Mod: CPTII,S$GLB,, | Performed by: OTOLARYNGOLOGY

## 2024-04-26 PROCEDURE — 3077F SYST BP >= 140 MM HG: CPT | Mod: CPTII,S$GLB,, | Performed by: STUDENT IN AN ORGANIZED HEALTH CARE EDUCATION/TRAINING PROGRAM

## 2024-04-26 PROCEDURE — 99999 PR PBB SHADOW E&M-EST. PATIENT-LVL III: CPT | Mod: PBBFAC,,, | Performed by: STUDENT IN AN ORGANIZED HEALTH CARE EDUCATION/TRAINING PROGRAM

## 2024-04-26 PROCEDURE — 4010F ACE/ARB THERAPY RXD/TAKEN: CPT | Mod: CPTII,S$GLB,, | Performed by: OTOLARYNGOLOGY

## 2024-04-26 PROCEDURE — 3074F SYST BP LT 130 MM HG: CPT | Mod: CPTII,S$GLB,, | Performed by: OTOLARYNGOLOGY

## 2024-04-26 PROCEDURE — 1159F MED LIST DOCD IN RCRD: CPT | Mod: CPTII,S$GLB,, | Performed by: STUDENT IN AN ORGANIZED HEALTH CARE EDUCATION/TRAINING PROGRAM

## 2024-04-26 PROCEDURE — 4010F ACE/ARB THERAPY RXD/TAKEN: CPT | Mod: CPTII,S$GLB,, | Performed by: STUDENT IN AN ORGANIZED HEALTH CARE EDUCATION/TRAINING PROGRAM

## 2024-04-26 PROCEDURE — 99204 OFFICE O/P NEW MOD 45 MIN: CPT | Mod: S$GLB,,, | Performed by: STUDENT IN AN ORGANIZED HEALTH CARE EDUCATION/TRAINING PROGRAM

## 2024-04-26 PROCEDURE — 3079F DIAST BP 80-89 MM HG: CPT | Mod: CPTII,S$GLB,, | Performed by: STUDENT IN AN ORGANIZED HEALTH CARE EDUCATION/TRAINING PROGRAM

## 2024-04-26 RX ORDER — MECLIZINE HYDROCHLORIDE 25 MG/1
25 TABLET ORAL 3 TIMES DAILY PRN
Qty: 60 TABLET | Refills: 2 | Status: SHIPPED | OUTPATIENT
Start: 2024-04-26

## 2024-04-26 RX ORDER — ONDANSETRON 4 MG/1
4 TABLET, ORALLY DISINTEGRATING ORAL EVERY 6 HOURS PRN
Qty: 27 TABLET | Refills: 0 | Status: SHIPPED | OUTPATIENT
Start: 2024-04-26

## 2024-04-26 RX ORDER — DULOXETIN HYDROCHLORIDE 20 MG/1
20 CAPSULE, DELAYED RELEASE ORAL DAILY
Qty: 30 CAPSULE | Refills: 1 | Status: SHIPPED | OUTPATIENT
Start: 2024-04-26 | End: 2024-06-25

## 2024-04-26 NOTE — PATIENT INSTRUCTIONS
Ondansetron/Zofran under the tongue for any vomiting and nausea.  Meclizine/Antivert by mouth if needed for vertigo.  Neither these medications or preventatives.  Low-salt maybe preventative.  Better control of sinus allergy symptoms as outlined if there are specific sinus allergy symptoms maybe preventative.  If this is an atypical presentation of migraine any number of food triggers may be to blame.  Information on migraine provided for informational purposes only.      We will proceed with audiologic testing including VNG.  No imaging ordered at this time but MRI given the facial spasm maybe our next step in the absence of any specific finding on the audiologic testing.      Favor Meniere's disease.  This maybe evolving and not as classic several years ago and previously evaluated.    Do not take vestibular suppressants like meclizine or even any antihistamines like Benadryl or chlorpheniramine for least 48 hours prior to VNG testing as this will in validate the responses and results.    Follow up in 6 weeks (only if the VNG and audiogram are completed prior).  Return sooner if there are any worsening or new symptoms or concerns.     Voice recognition software was used in the creation of this note/communication and any sound-alike errors which may have occurred from its use should be taken in context when interpreting.  If such errors prevent a clear understanding of the note/communication, please contact the office for clarification.       ANTIHISTAMINES    Antihistamines can be beneficial in the management of allergic and nonallergic nasal symptoms (rhinitis).  Antihistamines come in 2 categories 1st generation and 2nd generation.  First generation antihistamines include medications like diphenhydramine (Benadryl) and chlorpheniramine.  These medications while affective in blocking the histamine receptor and preventing the affects of histamine (itching, sneezing and beginning the process of congestion) they  also by 2 other receptors resulting in side effects of dryness which maybe beneficial but also sedation.  Second-generation antihistamines (loratadine/Claritin, Clarinex, fexofenadine/Allegra, Zyrtec/cetirizine and Xyzal/levo cetirizine) are just as effective if not more effective at blocking histamine but without the side effects of dryness and sedation.  The 1st generation antihistamines have a role better generally discouraged because of the sedation side effects which can be bothersome town right dangerous including the operation of heavy equipment motor vehicles.  Antihistamines also come in nasal form (azelastine/Astelin/Astepro and olopatadine/PATANASE).  These are 1st generation but are not taken by mouth so the side effects tend to be those of less sedation and maybe some favorable drying of nasal secretions.  They have a supportive role in the management of nasal symptoms.      NASAL SALINE    Still saline comes in many preparations including sprays/mists, gels, and rinses.  Different preparations served different purposes.  Saline spray helps to briefly moisturize the nose and help clear mucus.  Saline gels coat the nose for longer protective benefit of keeping the linings the nose moist.  Saline rinses clear the nose and sinuses and a more thorough way in her best used for significant postnasal drip and sinus complaints.  A combination of saline sprays/mists, gels and rinses should be used to address routine nasal clearing and dryness issues as well as flushing for better control of allergy and postnasal drip symptoms.  There is no real risk of over use of nasal saline products.  Saline sprays do not have any of the potential rebound or addiction of nasal decongestant sprays.  Nasal saline sprays and rinses should be used prior to the application of any medicated nasal sprays such as nasal steroids or nasal antihistamine sprays.        INTRANASAL STEROID SPRAYS      Intranasal steroid sprays are  available both by prescription and over-the-counter both in generic and brand name preparations.  They are all very similar in efficacy and side effect profiles.  Over-the-counter and prescription intranasal steroids include fluticasone propionate (Flonase), fluticasone furoate (Sensimist), triamcinolone (Nasacort), and budesonide (Rhinocort).  While these medications are available as prescriptions as well there are few nasal steroids in her available by prescription only and include mometasone (Nasonex), flunisolide (Nasarel), and beclomethasone (QNASL).    Nasal steroids or the foundation of treatment of both allergy and other inflammatory conditions of the nose and sinuses.  They are safe for regular use and while there are many side effects listed most of these are steroid class effects and not typically encountered.  Typical side effects include dryness and even ulceration and bleeding of the nose.  These side effects can be minimized by proper application and proper moisturization with saline and saline gel.    Sometimes changing between 1 brand of nasal steroid and another can result in improved control of symptoms especially after long term use of one particular nasal steroid.    Proper application of the nasal steroid spray is accomplished by spraying towards the I/ear on the same side with the tip of the superior just barely inside the nostril with the chin slightly downward.  Any dripping should be gently inhaled not sniff test backwards into the throat.  Labeled instructions should be followed.        ASTELIN (Azelastine) nasal spray    Astelin is a topical nasal antihistamine which can be of additional benefit in controlling nasal allergy and postnasal drip.  Typically is recommended on an as-needed basis 1-2 sprays each nostril twice daily.  People often find it beneficial at night.  This typically added to her regimen of saline and nasal steroids as a 3rd line agent for as needed use.  Excessive use  can cause excessive dryness and even nose bleeds.  It has a very strong taste which many people find intolerable.  Astelin needs to be stopped 5-7 days prior to any skin allergy testing just like oral antihistamines as it will inhibit the skin response.      SINUS RINSE INSTRUCTIONS    Nasal Saline Irrigation Instructions  You can wash your nasal and sinus passages using nasal saline (salt water) irrigation. This   is simple and effective. Follow the instructions below, as well as the ones provided by your   physician.  Supplies  First, you will need a nasal saline irrigation bottle and rinse solution.   You can purchase nasal rinse kits that include these items (such as   NeilMed®, Ayr®, Simply Saline®, Ocean Complete®) at most drug   stores. You can also make your own saline irrigation solution by   adding kosher (non-iodine) salt and baking soda to distilled water.   Your physician may tell you to add medications like a steroid or   antibiotic to the rinse as needed.  Steps for nasal irrigation  Step 1. Fill the bottle  ? Wash your hands.  ? Fill the irrigation bottle with lukewarm distilled water or boiled water that has cooled.  Step 2. Mix the solution  ? Put the saline and salt packet contents into the bottle.  ? Tighten the top of the bottle and shake it gently to dissolve the mixture.  ? If you are making your own solution:   - Add 1/4 to 1/2 teaspoon of baking soda and 1/8 teaspoon of kosher (non-iodine) salt   into the bottle.   - Tighten the top of the bottle.   - Shake the bottle gently to dissolve the mixture.  Step 3. Get into position  ?  front of the sink.  ? Unless you were instructed to use another position, bend forward.   Then tilt your face down about 45 degrees so that you are looking   down into the sink.  ? Gently place the spout of the saline bottle against 1 of your nostrils.  [facility]  CARE AND TREATMENT  Patient Education  ©2018 [employer]  [employer]  ©2018 [employer]  Step 4. Rinse  ? Breathing through your mouth, gently squeeze the   bottle. This will squirt the solution into your nostril. The   solution will start to drain from the other nostril. Some   may drain from your mouth. This is normal.  ? Use 2 ounces (half of the bottle) on each nostril.  ? Afterwards, you may need to blow your nose gently to   help drain any solution that is left behind.  Step 5. Repeat  ? Repeat steps 3 and 4 with the other nostril.  You can watch a video to learn how to do nasal saline irrigation. Go to [email or web address] and   search for NeilMed Sinus Rinse.  Step 6.  Clean the bottle and cap. Air dry the Sinus Rinse bottle, cap, and tube on a clean paper towel, a lint free towel, or use NeilMed® NasaDOCK® or NasaDOCK plus (sold separately) to store the bottle, cap and tube.  Please read Warnings before using.  Our recommendation is to replace the bottle every three months.      NEILMED CLEAING INSTRUCTIONS    It is very important to keep these devices clean and free from any contamination. Replace the bottle every 3 months.  NasaDock Plus  NasaDock NeilMed® SINUS RINSE Squeeze Bottle: Please perform routine inspections of the bottle and tube for any discolorations and cracks. If there are any visual signs of deterioration or permanent color changes, please clean thoroughly. If the discolorations remain after cleansing, discard the items and purchase new ones. Please follow these instructions after each use of the product. Be sure to replace your product after three months.  Step 1: Rinse the cap, tube and bottle using running water. Fill the bottle with distilled, micro-filtered (through 0.2 micron), reverse osmosis filtered, commercially bottled or previously boiled and cooled down water at lukewarm or body temperature..  Step 2: Add a few drops of dish washing liquid or baby shampoo.  Step 3: Attach the cap and tube to the bottle; hold your finger over  the opening in the cap and shake the bottle vigorously.  Step 4: Squeeze the bottle hard to allow the soapy solution to clean the interior of the tube and the cap. Empty out the bottle completely.  Step 5: Rinse the soap from the bottle, cap and tube thoroughly and place the items on a clean paper towel to dry or use the preferred NasaDOCK® or NasaDOCK plus.    The NasaDOCK® is a simple, hygienic way to dry and store the SINUS RINSE bottle, cap and tube. NasaDOCK® comes with various hanging options and is available in different colors. Our newest model also offers storage for our SINUS RINSE mixture packets. We strongly suggest using NasaDOCK® as an inexpensive, easy way to dry the cap, tube and SINUS RINSE bottle.        Cleaning:  Do not use a  to clean the inside of a bottle. While our bottle is  safe, a  will not adequately clean the SINUS RINSE bottle. The water jets in dishwashers cannot enter the narrow neck of the bottle, and portions of the bottles interior will not be cleaned thoroughly. Additional methods of cleaning the bottle include the use of concentrated white vinegar or isopropyl alcohol (70% concentration), followed by scrubbing and rinsing as described above.       Microwave Disinfection  Clean the device with soap and water as mentioned above and shake off the excess water. Now place the bottle, cap and tube in the microwave for 40 seconds. This will disinfect the bottle, cap and tube. If the microwave has been used recently, please make sure that the inside of the microwave has cooled back down to room temperature before using it to disinfect the bottle.    NeilMed NasaFlo® Neti Pot Users:  Use the same procedure as above.    Sinugator® Cleaning Directions:  Clean the Sinugator® by running plain water and dry with a clean lint free towel and then air dry the unit by keeping it open to the air. The nasal  tip, blue reservoir and white soft  tube can be disinfected by cleaning with soap and water and shaking off the excess water before placing in the home microwave for 60 seconds. Clean the entire unit with a few drops of dishwashing liquid and water every three days to keep the unit clean. As a fi nal rinse to wash off any residual soap or tap water, use either distilled, micro-filtered (through 0.2 micron filter), commercially bottled or previously boiled & cooled down water. Please make sure to rinse thoroughly during each wash so no soap is left behind. DO NOT place the white motor unit in microwave for disinfection. Because of the units stainless steel components, this can cause damage or fire hazards.    General Principles of Maintenance & Storage:  When permissible use a microwave periodically to disinfect devices. Always store NeilMed® products in a cool and dry place with adequate ventilation. NasaDOCK® or NasaDOCK plus offer a simple hygienic way to air dry & neatly store the bottle, cap, tube and NasaFlo. Do not store the bottle with the cap screwed on, unless both are dry. Do not store the wet parts in a sealed plastic bag. If you travel before they are dry, wrap parts separately in paper towels. Hand soap or shampoo can be used for cleaning parts while away from home.        USE ONLY AS DIRECTED, IF SYMPTOMS PERSIST SEE YOUR DOCTOR/HEALTHCARE PROFESSIONAL. ALWAYS READ THE LABEL.

## 2024-04-26 NOTE — PROGRESS NOTES
[facility]    Subjective:      Patient: Michele Chi Jr. Patient PCP: Nahun Menchaca MD         :  1970     Sex:  male      MRN:  [id number]          Date of Visit: 2024      Chief Complaint: Dizziness and Ear Fullness (Right ear, had tubes in both ears about 4 or 5 years ago, they have now fallen out, Right ear feels like it has popping in it. Going on since tonsils have been taken out 15 years ago. Dizziness going on for about two months, about a wk or two worse. Had some others)      Patient ID: Michele Chi Jr. is a 53 y.o. male     Patient is a  lifelong NON-smoker with a past medical history of palsy with elevated diaphragm, HTN, GERD, WM, and prior cervical spinal fusion self-referred for right ear fullness as well as dizziness.  Required intubation of both ears 4-5 years ago.  Experiencing some popping sensation in the right ear.  Feels this is related temporally to tonsillectomy 15 years ago.      Dizziness began 2 months ago pretty severe for a week or 2.  Has had episodes in the past.  No audiogram for review.  No head imaging since  with CT temporal bones at that time showing on my review well-aerated middle ears mastoids and even posterior petrous apices.  No paranasal sinus mucoperiosteal thickening with some right-sided nasal septal deviation noted.  Images in the coronal plane at that time showed no evidence of superior semicircular canal dehiscence or lateral canal dehiscence though on the left side there is some close approximation of the medial aspect of the superior semicircular canal to a depression in the temporal fossa.    Patient reports years of episodic symptoms involving right ear fullness without distinct vertigo hissing or roaring.  Reports audiologic testing in the ears past and evaluation with Dr. Monteiro.  Had tube placement for 5 years ago in [place] and felt this did improve possibly his symptoms but he can not say for sure he never had any  recurrent episodes of fullness or vertigo while he had the tubes in place.  They were placed bilaterally.  He does not eat much in the way of salt/sodium and has not identified this or any other intake as a trigger.  He has no migraine history.    He also reports a feeling in his face pointing to the maxillary prominence in his zygomatic cheek area.  It 1st he described it more as a discomfort but then on clarification he felt more like it was spasm asking as wife if she could see it but he does not feel that it is visible just a feeling.    He inquires about sinus allergy symptoms contributing given that there seems to be a seasonality to this.  He does not have any classic sinus allergy symptoms of nasal obstruction, itching and sneezing, discolored or atypical drainage, loss of smell or facial or dental pain.  He has noticed a he feels crackling and popping with swallowing at times on the right side.  He is also felt that when the ear pops like on a flight or at high altitude his hearing improves.    Labs:  WBC   Date Value Ref Range Status   01/04/2024 10.40 3.90 - 12.70 K/uL Final     Hemoglobin   Date Value Ref Range Status   01/04/2024 13.9 (L) 14.0 - 18.0 g/dL Final     Platelets   Date Value Ref Range Status   01/04/2024 227 150 - 450 K/uL Final     Creatinine   Date Value Ref Range Status   01/04/2024 1.11 0.80 - 1.50 mg/dL Final       Past Medical History  He has a past medical history of Gastritis, GERD (gastroesophageal reflux disease), Hypertension, Internal hemorrhoids, and Restless leg syndrome.    Family / Surgical / Social History  His family history includes Hypertension in his father.    Past Surgical History:   Procedure Laterality Date    APPENDECTOMY      ARTHROSCOPY OF KNEE Left     CERVICAL FUSION      COLONOSCOPY N/A 8/30/2023    Procedure: COLONOSCOPY;  Surgeon: Yuriy Wilburn MD;  Location: [facility];  Service: Endoscopy;  Laterality: N/A;    ESOPHAGOGASTRODUODENOSCOPY N/A  "05/14/2021    Procedure: EGD (ESOPHAGOGASTRODUODENOSCOPY);  Surgeon: Yuriy Wilburn MD;  [facility];  Service: Endoscopy;  Laterality: N/A;    HEMORRHOID SURGERY      INJECTION OF ANESTHETIC AGENT AROUND MULTIPLE INTERCOSTAL NERVES Left 9/14/2023    Procedure: BLOCK, NERVE, INTERCOSTAL, 2 OR MORE;  Surgeon: Ponce Knight MD;  Location: [facility] OR [address] [place] [address];  Service: Thoracic;  Laterality: Left;    ROBOT-ASSISTED LAPAROSCOPIC PLICATION OF DIAPHRAGM USING DA DOREEN XI Left 9/14/2023    Procedure: XI ROBOTIC PLICATION, DIAPHRAGM;  Surgeon: Ponce Knight MD;  [facility];  Service: Thoracic;  Laterality: Left;    TONSILLECTOMY      addenoidectomy    TYMPANOSTOMY TUBE PLACEMENT         Social History     Tobacco Use    Smoking status: Never    Smokeless tobacco: Never   Substance and Sexual Activity    Alcohol use: Never    Drug use: Never    Sexual activity: Yes       Medications  He has a current medication list which includes the following Facility-Administered Medications: sodium chloride 0.9% and sodium chloride 0.9%.      Allergies  Review of patient's allergies indicates:  No Known Allergies    All medications, allergies, and past history have been reviewed.    Objective:      Vitals:      4/8/2024     9:21 AM 4/23/2024     3:32 PM 4/26/2024     8:43 AM   Vitals - 1 value per visit   SYSTOLIC   121   DIASTOLIC   76   Pulse   66   Weight (lb)  257.28 259.26   Weight (kg)  116.7 117.6   Height   6' 1" (1.854 m)   BMI (Calculated)   34.2   Pain Score Three Eight Eight       Body surface area is 2.46 meters squared.    Physical Exam:    GENERAL  APPEARANCE -  alert, appears stated age, and cooperative  BARRIER(S) TO COMMUNICATION -  none VOICE - appropriate for age and gender    INTEGUMENTARY  no suspicious head and neck lesions    HEENT  HEAD: Normocephalic, without obvious abnormality, atraumatic  FACE: INSPECTION - Symmetric, no signs of trauma, no suspicious lesion(s)      " STRENGTH - facial symmetry intact     PALPATION -  No masses     SALIVARY GLANDS - non-tender with no appreciable mass    NECK/THYROID: normal atraumatic, no neck masses, normal thyroid, no jvd    EYES  Normal occular alignment and mobility with no visible nystagmus at rest    EARS/NOSE/MOUTH/THROAT  EARS  PINNAE AND EXTERNAL EARS - no suspicious lesion OTOSCOPIC EXAM (surgical microscopy was not used for visualization/instrumentation): EAR EXAM - Normal ear canals, tympanic membranes and mobility, and middle ear spaces bilaterally.  HEARING - grossly intact to voice/finger rub    NOSE AND SINUSES  EXTERNAL NOSE - Grossly normal for age/sex  SEPTUM - normal/no obstruction on anterior exam without decongestion TURBINATES - within normal limits MUCOSA - within normal limits     MOUTH AND THROAT   ORAL CAVITY, LIPS, TEETH, GUMS & TONGUE - moist, no suspicious lesions  OROPHARYNX /TONSILS/PHARYNGEAL WALLS/HYPOPHARYNX - no erythema or exudates  NASOPHARYNX - limited mirror exam - unable to visualize due to anatomy/gag  LARYNX -  - limited mirror exam - unable to visualize due to anatomy/gag      CHEST AND LUNG   INSPECTION & AUSCULTATION - normal effort, no stridor    CARDIOVASCULAR  AUSCULTATION & PERIPHERAL VASCULAR - regular rate and rhythm.    NEUROLOGIC  MENTAL STATUS - alert, interactive CRANIAL NERVES - normal    LYMPHATIC  HEAD AND NECK - non-palpable; SUPRACLAVICULAR - deferred      Procedure(s):  None          Assessment:      Problem List Items Addressed This Visit    None  Visit Diagnoses       Ear fullness, right    -  Primary    History of eustachian tube dysfunction        History of myringotomy        Facial spasm        Episodes of vertigo occurring infrequently                     Plan:      Ondansetron/Zofran under the tongue for any vomiting and nausea.  Meclizine/Antivert by mouth if needed for vertigo.  Neither these medications or preventatives.  Low-salt maybe preventative.  Better control of  sinus allergy symptoms as outlined if there are specific sinus allergy symptoms maybe preventative.  If this is an atypical presentation of migraine any number of food triggers may be to blame.  Information on migraine provided for informational purposes only.      We will proceed with audiologic testing including VNG.  No imaging ordered at this time but MRI given the facial spasm maybe our next step in the absence of any specific finding on the audiologic testing.      Favor Meniere's disease.  This maybe evolving and not as classic several years ago and previously evaluated.    Follow up in 6 weeks (only if the VNG and audiogram are completed prior).  Return sooner if there are any worsening or new symptoms or concerns.              Voice recognition software was used in the creation of this note/communication and any sound-alike errors which may have occurred from its use should be taken in context when interpreting.  If such errors prevent a clear understanding of the note/communication, please contact the office for clarification.

## 2024-04-29 ENCOUNTER — TELEPHONE (OUTPATIENT)
Dept: PAIN MEDICINE | Facility: CLINIC | Age: 54
End: 2024-04-29
Payer: COMMERCIAL

## 2024-04-29 DIAGNOSIS — M50.30 DDD (DEGENERATIVE DISC DISEASE), CERVICAL: ICD-10-CM

## 2024-04-29 DIAGNOSIS — M54.12 CERVICAL RADICULITIS: Primary | ICD-10-CM

## 2024-04-29 NOTE — TELEPHONE ENCOUNTER
----- Message from Valdo Barrios DO sent at 2024  3:01 PM CDT -----  Regarding: Order for ZAHIDA CARRANZA JR.    Patient Name: ZAHIDA CARRANZA JR.([id number])  Sex: Male  : 1970      PCP: MARY MAURICIO    Center: [facility]     Types of orders made on 2024: Medications, Outpatient Referral, Procedure                                       Request    Order Date:2024  Ordering User:VALDO BARRIOS [[id number]]  Encounter Provider:Valdo Barrios DO [[id number]]  Authorizing Provider: Valdo Barrios DO [[id number]]  Department:[facility] PAIN MANAGEMENT[[id number]]    Common Order Information  Procedure -> Epidural Injection (specify level) Cmt: C7-T1 RAFAELA    Pre-op Diagnosis -> Cervical radiculopathy     Order Specific Information  Order: Procedure Order to Pain Management [Custom: [id number]]  Order #:          [id number]  Qty: 1 FUTURE    Priority: Routine  Class: Clinic Performed    Future Order Information      Expires on:2025            Expected by:2024                   Associated Diagnoses      M54.12 Cervical radiculopathy      M50.30 DDD (degenerative disc disease), cervical      Physician -> Gelter         Is patient on anti-coagulants? -> No         Facility Name: -> [place]         Follow-up: -> 2 weeks             Priority: Routine  Class: Clinic Performed    Future Order Information      Expires on:2025            Expected by:2024                   Associated Diagnoses      M54.12 Cervical radiculopathy      M50.30 DDD (degenerative disc disease), cervical      Procedure -> Epidural Injection (specify level) Cmt: C7-T1 RAFAELA        Physician -> Gelter         Is patient on anti-coagulants? -> No           Pre-op Diagnosis -> Cervical radiculopathy         Facility Name: -> [place]         Follow-up: -> 2 weeks

## 2024-04-29 NOTE — TELEPHONE ENCOUNTER
----- Message from Valdo Barrios DO sent at 2024  3:01 PM CDT -----  Regarding: Order for ZAHIDA CARRANZA JR.    Patient Name: ZAHIDA CARRANZA JR.([id number])  Sex: Male  : 1970      PCP: MARY MAURICIO    Center: [facility]     Types of orders made on 2024: Medications, Outpatient Referral, Procedure                                       Request    Order Date:2024  Ordering User:VALDO BARRIOS [[id number]]  Encounter Provider:Valdo Barrios DO [[id number]]  Authorizing Provider: Valdo Barrios DO [[id number]]  Department:[facility] PAIN MANAGEMENT[[id number]]    Common Order Information  Procedure -> Epidural Injection (specify level) Cmt: C7-T1 RAFAELA    Pre-op Diagnosis -> Cervical radiculopathy     Order Specific Information  Order: Procedure Order to Pain Management [Custom: [id number]]  Order #:          [id number]  Qty: 1 FUTURE    Priority: Routine  Class: Clinic Performed    Future Order Information      Expires on:2025            Expected by:2024                   Associated Diagnoses      M54.12 Cervical radiculopathy      M50.30 DDD (degenerative disc disease), cervical      Physician -> Gelter         Is patient on anti-coagulants? -> No         Facility Name: -> [facility]         Follow-up: -> 2 weeks             Priority: Routine  Class: Clinic Performed    Future Order Information      Expires on:2025            Expected by:2024                   Associated Diagnoses      M54.12 Cervical radiculopathy      M50.30 DDD (degenerative disc disease), cervical      Procedure -> Epidural Injection (specify level) Cmt: C7-T1 RAFAELA        Physician -> Gelter         Is patient on anti-coagulants? -> No           Pre-op Diagnosis -> Cervical radiculopathy         Facility Name: -> [facility]         Follow-up: -> 2 weeks

## 2024-05-09 ENCOUNTER — TELEPHONE (OUTPATIENT)
Dept: PAIN MEDICINE | Facility: CLINIC | Age: 54
End: 2024-05-09
Payer: COMMERCIAL

## 2024-05-14 NOTE — PRE-PROCEDURE INSTRUCTIONS
Unable to reach pt via phone.  Busy signal.  Portal message sent.  Read receipt available. The following message was sent to pt's portal.      Dear Michele ,     You are scheduled for a procedure with Dr. Barrios on 5/16/2024.  Your scheduled arrival time is 08:00 am.  This arrival time is roughly 1 hour before your anticipated procedure time to allow sufficient time for pre-op..  Please wear comfortable clothes. You will be placed in a gown for your procedure.  Please do not wear a dress.  This procedure will take place at the [facility] at the corner of [facility] and [facility].  It is in the [place] next to [facility].  The address is:     [address] [place] [address].  [place] [address]     After entering the building, you will proceed to the second floor where you can check in with registration. You should take any medications that you routinely take for blood pressure, heart medications, thyroid, cholesterol, etc.      The fasting restrictions are dependent on whether or not you are receiving sedation.  Sedation is not available for all procedures.      Your fasting instructions are as follow:  IV sedation. You should not eat for 8 hours and can only drink clear liquids (water or black coffee without cream/sugar) up until 2 hours before your scheduled time.  You CANNOT drive yourself and must have a .     If you are on blood thinners, you need to follow the anticoagulation instructions that had been discussed previously.  You should only stop the blood thinners if it was approved by your primary care physician or your cardiologist.  In the event that you are not able to stop your blood thinners, a blood thinner was not listed on your medication list, or we were not able to get clearance from your cardiologist, then the procedure may have to be postponed/canceled.      IF you were told to stop your blood thinners, this is how long you should generally hold some of the  more common ones.  Remember that stopping blood thinners is only necessary for certain procedures. If you are unsure of your instructions, please call us.   Aspirin - 5 days  Plavix/Clopidogrel - 7 days  Warfarin / Coumadin - 5 days  Eliquis - 3 days  Pradaxa/Dabigatran - 4 days  Xarelto/Rivaroxaban - 3 days     If you are a diabetic, do not take your medication if you will be fasting, but bring it with you. Please plan on being here for roughly 3 hours.     Please call us if you have been sick (running fever, having any flu-like symptoms) or have been taking antibiotics in the past 2 weeks or had any outpatient procedures other than with us (colonoscopy, endoscopy, OBGYN, dental, etc.). If you have been previously COVID positive, you will need to hold off on your procedure until you are symptom free for 10 days. If you did not have any symptoms, you can have your procedure 10 days from your positive test result.       *HOLD ALL VITAMINS, MINERALS, HERBS (INCLUDING HERBAL TEAS) AND SUPPLEMENTS  *SHOWER WITH ANTIBACTERIAL SOAP (EX. DIAL) NIGHT BEFORE AND MORNING OF PROCEDURE  *DO NOT APPLY ANY LOTIONS, OILS, POWDERS, PERFUME/COLOGNE, OINTMENTS, GELS, CREAMS, MAKEUP OR DEODORANT TO YOUR SKIN MORNING OF PROCEDURE  *LEAVE JEWELRY AND ANY VALUABLES AT HOME  *WEAR LOOSE COMFORTABLE CLOTHING (PREFERABLY A BUTTON UP SHIRT)     Please reply to this message as receipt of delivery.     Thank you,  [facility]  [place], LPN [facility]  Pre-Admit

## 2024-05-16 ENCOUNTER — HOSPITAL ENCOUNTER (OUTPATIENT)
Facility: HOSPITAL | Age: 54
Discharge: HOME | End: 2024-05-16
Attending: STUDENT IN AN ORGANIZED HEALTH CARE EDUCATION/TRAINING PROGRAM | Admitting: STUDENT IN AN ORGANIZED HEALTH CARE EDUCATION/TRAINING PROGRAM
Payer: COMMERCIAL

## 2024-05-16 VITALS
HEART RATE: 58 BPM | SYSTOLIC BLOOD PRESSURE: 142 MMHG | RESPIRATION RATE: 16 BRPM | OXYGEN SATURATION: 97 % | TEMPERATURE: 98 F | DIASTOLIC BLOOD PRESSURE: 77 MMHG | BODY MASS INDEX: 34.19 KG/M2 | HEIGHT: 73 IN | WEIGHT: 258 LBS

## 2024-05-16 DIAGNOSIS — M50.30 DDD (DEGENERATIVE DISC DISEASE), CERVICAL: ICD-10-CM

## 2024-05-16 DIAGNOSIS — M54.12 CERVICAL RADICULOPATHY: Primary | ICD-10-CM

## 2024-05-16 DIAGNOSIS — G89.29 CHRONIC PAIN: ICD-10-CM

## 2024-05-16 PROCEDURE — 62321 NJX INTERLAMINAR CRV/THRC: CPT | Mod: ,,, | Performed by: STUDENT IN AN ORGANIZED HEALTH CARE EDUCATION/TRAINING PROGRAM

## 2024-05-16 PROCEDURE — 25500020 PHARM REV CODE 255: Performed by: STUDENT IN AN ORGANIZED HEALTH CARE EDUCATION/TRAINING PROGRAM

## 2024-05-16 PROCEDURE — 63600175 PHARM REV CODE 636 W HCPCS: Performed by: STUDENT IN AN ORGANIZED HEALTH CARE EDUCATION/TRAINING PROGRAM

## 2024-05-16 PROCEDURE — 62321 NJX INTERLAMINAR CRV/THRC: CPT | Performed by: STUDENT IN AN ORGANIZED HEALTH CARE EDUCATION/TRAINING PROGRAM

## 2024-05-16 PROCEDURE — 25000003 PHARM REV CODE 250: Performed by: STUDENT IN AN ORGANIZED HEALTH CARE EDUCATION/TRAINING PROGRAM

## 2024-05-16 RX ORDER — MIDAZOLAM HYDROCHLORIDE 1 MG/ML
INJECTION INTRAMUSCULAR; INTRAVENOUS
Status: DISCONTINUED | OUTPATIENT
Start: 2024-05-16 | End: 2024-05-16 | Stop reason: HOSPADM

## 2024-05-16 RX ORDER — DEXAMETHASONE SODIUM PHOSPHATE 10 MG/ML
INJECTION INTRAMUSCULAR; INTRAVENOUS
Status: DISCONTINUED | OUTPATIENT
Start: 2024-05-16 | End: 2024-05-16 | Stop reason: HOSPADM

## 2024-05-16 RX ORDER — FENTANYL CITRATE 50 UG/ML
INJECTION, SOLUTION INTRAMUSCULAR; INTRAVENOUS
Status: DISCONTINUED | OUTPATIENT
Start: 2024-05-16 | End: 2024-05-16 | Stop reason: HOSPADM

## 2024-05-16 RX ORDER — LIDOCAINE HYDROCHLORIDE 20 MG/ML
INJECTION, SOLUTION EPIDURAL; INFILTRATION; INTRACAUDAL; PERINEURAL
Status: DISCONTINUED | OUTPATIENT
Start: 2024-05-16 | End: 2024-05-16 | Stop reason: HOSPADM

## 2024-05-16 RX ORDER — SODIUM CHLORIDE 9 MG/ML
INJECTION, SOLUTION INTRAVENOUS CONTINUOUS
Status: DISCONTINUED | OUTPATIENT
Start: 2024-05-16 | End: 2024-05-16 | Stop reason: HOSPADM

## 2024-05-16 NOTE — PLAN OF CARE
Michele Chi Jr. has met all discharge criteria from Phase II. Vital Signs are stable, ambulating  without difficulty. Discharge instructions given, patient verbalized understanding. Discharged from facility via wheelchair in stable condition.

## 2024-05-16 NOTE — DISCHARGE SUMMARY
Discharge Note  Short Stay      SUMMARY     Admit Date: 5/16/2024    Attending Physician: Karina Barrios      Discharge Physician: Karina Barrios      Discharge Date: 5/16/2024 9:59 AM    Procedure(s) (LRB):  RAFAELA C7/T1 (N/A)    Final Diagnosis: Cervical radiculitis [M54.12]  DDD (degenerative disc disease), cervical [M50.30]    Disposition: Home or self care    Patient Instructions:   Current Discharge Medication List        CONTINUE these medications which have NOT CHANGED    Details   DULoxetine (CYMBALTA) 20 MG capsule Take 1 capsule (20 mg total) by mouth once daily.  Qty: 30 capsule, Refills: 1      meclizine (ANTIVERT) 25 mg tablet Take 1 tablet (25 mg total) by mouth 3 (three) times daily as needed for Dizziness.  Qty: 60 tablet, Refills: 2      ondansetron (ZOFRAN-ODT) 4 MG TbDL Take 1 tablet (4 mg total) by mouth every 6 (six) hours as needed (nausea and vomiting).  Qty: 27 tablet, Refills: 0                 Discharge Diagnosis: Cervical radiculitis [M54.12]  DDD (degenerative disc disease), cervical [M50.30]  Condition on Discharge: Stable with no complications to procedure   Diet on Discharge: Same as before.  Activity: as per instruction sheet.  Discharge to: Home with a responsible adult.  Follow up: 2-4 weeks       Please call my office or pager at [phone] if experienced any weakness or loss of sensation, fever > 101.5, pain uncontrolled with oral medications, persistent nausea/vomiting/or diarrhea, redness or drainage from the incisions, or any other worrisome concerns. If physician on call was not reached or could not communicate with our office for any reason please go to the nearest emergency department

## 2024-05-16 NOTE — OP NOTE
Cervical Interlaminar Epidural Steroid Injection under Fluoroscopic Guidance    The procedure, risks, benefits, and options were discussed with the patient. There are no contraindications to the procedure. The patent expressed understanding and agreed to the procedure. Informed written consent was obtained prior to the start of the procedure and can be found in the patient's chart.     PATIENT NAME: Michele Chi Jr.   MRN: [id number]     DATE OF PROCEDURE: 05/16/2024    PROCEDURE: Cervical Interlaminar Epidural Steroid Injection C7/T1 under Fluoroscopic Guidance    PRE-OP DIAGNOSIS: Cervical radiculitis [M54.12]  DDD (degenerative disc disease), cervical [M50.30] Cervical radiculopathy [M54.12]    POST-OP DIAGNOSIS: Same    PHYSICIAN: Karina Barrios DO     ASSISTANTS: None    MEDICATIONS INJECTED: Preservative-free Decadron 10mg with  preservative free normal saline    LOCAL ANESTHETIC INJECTED: Xylocaine 2%     SEDATION: Versed 2mg and Fentanyl 50mcg                                                                                                                                                                                     Conscious sedation ordered by M.D. Patient re-evaluation prior to administration of conscious sedation. No changes noted in patient's status from initial evaluation. The patient's vital signs were monitored by RN and patient remained hemodynamically stable throughout the procedure.    Event Time In   Sedation Start 0949       ESTIMATED BLOOD LOSS: None    COMPLICATIONS: None    TECHNIQUE: Time-out was performed to identify the patient and procedure to be performed. With the patient laying in a prone position, the surgical area was prepped and draped in the usual sterile fashion using ChloraPrep and a fenestrated drape. The level was determined under fluoroscopy guidance. Skin anesthesia was achieved by injecting Lidocaine 2% over the injection site.  The interlaminar space was then  approached with a 20 gauge, 3.5 inch Tuohy needle that was introduced under fluoroscopic guidance with AP, lateral and/or contralateral oblique imaging. Once the Ligamentum flavum was encountered loss of resistance to saline was used to enter the epidural space. With positive loss of resistance and negative aspiration for CSF or Blood, contrast dye  Omnipaque (300mg/mL) was injected to confirm placement and there was no vascular runoff. Then 4 mL of the medication mixture listed above was then injected slowly. Displacement of the radio opaque contrast after injection of the medication confirmed that the medication went into the area of the epidural space. The needles were removed, and bleeding was nil. A sterile dressing was applied. No specimens collected. The patient tolerated the procedure well.       The patient was monitored after the procedure in the recovery area. They were given post-procedure and discharge instructions to follow at home. The patient was discharged in a stable condition.    Karina Barrios DO

## 2024-05-16 NOTE — DISCHARGE INSTRUCTIONS
Home Care Instructions Pain Management:    1.  DIET:    You may resume your normal diet today.    2.  BATHING:    You may shower with luke warm water.    3.  DRESSING:    You may remove your bandage today.    4.  ACTIVITY LEVEL:      You may resume your normal activities 24 hours after your procedure.    5.  MEDICATIONS:    You may resume your normal medications today.    6.  SPECIAL INSTRUCTIONS:    No heat to the injection site for 24 hours including bath or shower, heating pad, moist heat or hot tubs.    Use an ice pack to the injection site for any pain or discomfort.  Apply ice packs for 20 minute intervals as needed.    If you have received any sedatives by mouth today, you can not drive for 12 hours.    If you have received sedation through an IV, you can not drive for 24 hours.    PLEASE CALL YOUR DOCTOR FOR THE FOLLOWIN.  Redness or swelling around the injection site.  2.  Fever of 101 degrees.  3.  Drainage (pus) from the injection site.  4.  For any continuous bleeding (some dried blood over the incision is normal.)    FOR EMERGENCIES:    If any unusual problems or difficulties occur during clinic hours, call [phone] or dial [phone].    Follow up with with your physician in 2-3 weeks.

## 2024-05-17 ENCOUNTER — OFFICE VISIT (OUTPATIENT)
Dept: UROLOGY | Facility: CLINIC | Age: 54
End: 2024-05-17
Attending: SPECIALIST
Payer: COMMERCIAL

## 2024-05-17 VITALS
DIASTOLIC BLOOD PRESSURE: 84 MMHG | WEIGHT: 257.06 LBS | BODY MASS INDEX: 34.07 KG/M2 | SYSTOLIC BLOOD PRESSURE: 132 MMHG | HEIGHT: 73 IN

## 2024-05-17 DIAGNOSIS — R39.89 PNEUMATURIA: ICD-10-CM

## 2024-05-17 DIAGNOSIS — R31.9 HEMATURIA, UNSPECIFIED TYPE: Primary | ICD-10-CM

## 2024-05-17 DIAGNOSIS — R10.9 FLANK PAIN, ACUTE: ICD-10-CM

## 2024-05-17 DIAGNOSIS — N52.9 ERECTILE DISORDER: ICD-10-CM

## 2024-05-17 LAB
BILIRUB SERPL-MCNC: NORMAL MG/DL
BLOOD URINE, POC: NORMAL
CLARITY, POC UA: NORMAL
COLOR, POC UA: NORMAL
GLUCOSE UR QL STRIP: 1000
KETONES UR QL STRIP: NORMAL
LEUKOCYTE ESTERASE URINE, POC: NORMAL
NITRITE, POC UA: NORMAL
PH, POC UA: 5
PROTEIN, POC: NORMAL
SPECIFIC GRAVITY, POC UA: 1.02
UROBILINOGEN, POC UA: NORMAL

## 2024-05-17 PROCEDURE — 3008F BODY MASS INDEX DOCD: CPT | Mod: CPTII,S$GLB,, | Performed by: SPECIALIST

## 2024-05-17 PROCEDURE — 3075F SYST BP GE 130 - 139MM HG: CPT | Mod: CPTII,S$GLB,, | Performed by: SPECIALIST

## 2024-05-17 PROCEDURE — 4010F ACE/ARB THERAPY RXD/TAKEN: CPT | Mod: CPTII,S$GLB,, | Performed by: SPECIALIST

## 2024-05-17 PROCEDURE — 1159F MED LIST DOCD IN RCRD: CPT | Mod: CPTII,S$GLB,, | Performed by: SPECIALIST

## 2024-05-17 PROCEDURE — 3079F DIAST BP 80-89 MM HG: CPT | Mod: CPTII,S$GLB,, | Performed by: SPECIALIST

## 2024-05-17 PROCEDURE — 99999 PR PBB SHADOW E&M-EST. PATIENT-LVL III: CPT | Mod: PBBFAC,,, | Performed by: SPECIALIST

## 2024-05-17 PROCEDURE — 99205 OFFICE O/P NEW HI 60 MIN: CPT | Mod: S$GLB,,, | Performed by: SPECIALIST

## 2024-05-17 PROCEDURE — 81002 URINALYSIS NONAUTO W/O SCOPE: CPT | Mod: S$GLB,,, | Performed by: SPECIALIST

## 2024-05-17 RX ORDER — SILDENAFIL 100 MG/1
100 TABLET, FILM COATED ORAL DAILY PRN
Qty: 12 TABLET | Refills: 11 | Status: SHIPPED | OUTPATIENT
Start: 2024-05-17 | End: 2024-05-17

## 2024-05-17 RX ORDER — SILDENAFIL 100 MG/1
100 TABLET, FILM COATED ORAL DAILY PRN
Qty: 12 TABLET | Refills: 10 | Status: SHIPPED | OUTPATIENT
Start: 2024-05-17 | End: 2024-05-17

## 2024-05-17 RX ORDER — SILDENAFIL 100 MG/1
100 TABLET, FILM COATED ORAL DAILY PRN
Qty: 12 TABLET | Refills: 11 | Status: SHIPPED | OUTPATIENT
Start: 2024-05-17 | End: 2025-05-17

## 2024-05-17 NOTE — PROGRESS NOTES
"[facility] - Urology   Clinic Note    SUBJECTIVE:     Chief Complaint   Patient presents with    Flank Pain     States has been having really bad back pain for about a week now. No hematuria since about 3 months ago. Has a cyst on his right kidney  but is having pain on the right, urine is foamy.        Referral from: Nahun Menchaca MD.    History of Present Illness:  Michele Chi Jr. is a 53 y.o. male who presents to clinic with a chief complaint of back pain.  In addition, he wishes to discuss erectile dysfunction, hematuria and "foam in his urine."    As far as his back pain is concerned it has been acute in onset and intermittent in nature.  He denies a history of kidney stones.  I note from the EMR that he has underwent multiple imaging studies, CT cervical spine and MRI C-spine, for back pain.      He is also concerned about several episodes of gross hematuria.  He mentions that he sees bubbles or foam in his urine when he voids.  No history of ulcerative colitis Crohn's disease or prostatitis.    He also wishes to discuss erectile dysfunction.  He has not tried a PDE5 inhibitor yet and would like to try some Viagra.  His libido is reportedly within normal limits.  He denies taking nitrates.    Patient endorses no additional complaints at this time.    Past Medical History:   Diagnosis Date    Gastritis     GERD (gastroesophageal reflux disease)     Hypertension     Internal hemorrhoids     Restless leg syndrome        Past Surgical History:   Procedure Laterality Date    APPENDECTOMY      ARTHROSCOPY OF KNEE Left     CERVICAL FUSION      COLONOSCOPY N/A 8/30/2023    Procedure: COLONOSCOPY;  Surgeon: Yuriy Wilburn MD;  [facility]: [facility];  Service: Endoscopy;  Laterality: N/A;    EPIDURAL STEROID INJECTION INTO CERVICAL SPINE N/A 5/16/2024    Procedure: RAFAELA C7/T1;  Surgeon: Karina Barrios DO;  Location: [facility] PAIN MANAGEMENT;  Service: Pain Management;  " Laterality: N/A;  20mins- no ac    ESOPHAGOGASTRODUODENOSCOPY N/A 05/14/2021    Procedure: EGD (ESOPHAGOGASTRODUODENOSCOPY);  Surgeon: Yuriy Wilburn MD;  Location: [facility];  Service: Endoscopy;  Laterality: N/A;    HEMORRHOID SURGERY      INJECTION OF ANESTHETIC AGENT AROUND MULTIPLE INTERCOSTAL NERVES Left 9/14/2023    Procedure: BLOCK, NERVE, INTERCOSTAL, 2 OR MORE;  Surgeon: Ponce Knight MD;  [facility];  Service: Thoracic;  Laterality: Left;    ROBOT-ASSISTED LAPAROSCOPIC PLICATION OF DIAPHRAGM USING DA DOREEN XI Left 9/14/2023    Procedure: XI ROBOTIC PLICATION, DIAPHRAGM;  Surgeon: Ponce Knight MD;  [facility];  Service: Thoracic;  Laterality: Left;    TONSILLECTOMY      addenoidectomy    TYMPANOSTOMY TUBE PLACEMENT         Family History   Problem Relation Name Age of Onset    Hypertension Father         Social History     Tobacco Use    Smoking status: Never    Smokeless tobacco: Never   Substance Use Topics    Alcohol use: Never    Drug use: Never       Current Outpatient Medications on File Prior to Visit   Medication Sig Dispense Refill    DULoxetine (CYMBALTA) 20 MG capsule Take 1 capsule (20 mg total) by mouth once daily. (Patient not taking: Reported on 5/17/2024) 30 capsule 1    meclizine (ANTIVERT) 25 mg tablet Take 1 tablet (25 mg total) by mouth 3 (three) times daily as needed for Dizziness. (Patient not taking: Reported on 5/17/2024) 60 tablet 2    ondansetron (ZOFRAN-ODT) 4 MG TbDL Take 1 tablet (4 mg total) by mouth every 6 (six) hours as needed (nausea and vomiting). (Patient not taking: Reported on 5/17/2024) 27 tablet 0     Current Facility-Administered Medications on File Prior to Visit   Medication Dose Route Frequency Provider Last Rate Last Admin    0.9%  NaCl infusion   Intravenous Continuous Yuriy Wilburn MD   Stopped at 05/14/21 1047    0.9%  NaCl infusion   Intravenous Continuous Yuriy Wilburn MD   Stopped at 08/30/23  "1145       Review of patient's allergies indicates:  No Known Allergies    Review of Systems   Genitourinary:  Positive for hematuria.   Musculoskeletal:  Positive for back pain and neck pain.        Review of Systems:  A review of 10+ systems was conducted with pertinent positive and negative findings documented in HPI with all other systems reviewed and negative.    OBJECTIVE:     Estimated body mass index is 33.91 kg/m² as calculated from the following:    Height as of this encounter: 6' 1" (1.854 m).    Weight as of this encounter: 116.6 kg (257 lb 0.9 oz).    Vital Signs (Most Recent)  Vitals:    05/17/24 1517   BP: 132/84       Physical Exam:    Physical Exam     GENERAL: patient sitting comfortably  HEENT: normocephalic  NECK: supple, no JVD  PULM: normal chest rise, no increased WOB  HEART: non-diaphoretic  ABDO: soft, nondistended, nontender  BACK: no CVA tenderness bilaterally  SKIN: warm, dry, well perfused  EXT: no bruising or edema  NEURO: grossly normal with no focal deficits  PSYCH: appropriate mood and affect    Genitourinary Exam:  deferred      LABS:     Lab Results   Component Value Date    BUN 12 01/04/2024    CREATININE 1.11 01/04/2024    WBC 10.40 01/04/2024    HGB 13.9 (L) 01/04/2024    HCT 43.2 01/04/2024     01/04/2024    AST 34 01/04/2024    ALT 36 01/04/2024    ALKPHOS 92 01/04/2024    ALBUMIN 4.4 01/04/2024    INR 1.0 08/04/2023        Urinalysis:   No results found for: "UAREFLEX"     PSA:  Lab Results   Component Value Date    PSADIAG 1.75 12/20/2023    PSADIAG 0.96 03/29/2021    PSADIAG 2.02 01/25/2018       Testosterone:  No results found for: "TOTALTESTOST", "TESTOSTERONE"     Imaging:  I have personally reviewed all relevant imaging studies.    Results for orders placed or performed during the hospital encounter of 04/19/24 (from the past 2160 hour(s))   CT Cervical Spine Without Contrast    Narrative    EXAMINATION:  CT CERVICAL SPINE WITHOUT CONTRAST    CLINICAL " HISTORY:  Spinal stenosis, cervical;Spinal stenosis, cervical region    TECHNIQUE:  Low dose axial images, sagittal and coronal reformations were performed though the cervical spine.  Contrast was not administered.    COMPARISON:  Cervical spine radiograph 04/08/2024, MRI cervical spine 02/15/2024    FINDINGS:  Postoperative changes of C4-C7 ACDF with partial osseous fusion across the disc spaces.  No perihardware lucency or fracture.  Alignment anatomic.  No prevertebral soft tissue swelling.    Alignment: Straightening of the normal cervical lordosis.    Vertebrae: No fracture.  No lytic or blastic lesion.    C1-2: Dens is intact.  Pre-dens space is maintained.    Skull base and craniocervical junction: Normal.    Degenerative findings:    There is mild disc height loss/degenerative disc disease with uncovertebral spurring at C3-4 and C7-T1.  There is prominent left-sided facet arthropathy at C2-3, noting joint hypertrophy with subchondral sclerosis/cystic change.  Posterior disc osteophyte complex noted at C3-4.  These findings contribute to mild/moderate spinal canal and neural foraminal stenosis at C3-4, correlating with the 02/15/2024 MRI exam.    C4-C7 is fused, as above.    Paraspinal muscles & soft tissues: Unremarkable.      Impression    C4-C7 ACDF, as above.  No acute findings.    Cervical spondylosis, as above.    Electronically signed by resident: Charisse Campa  Date:    04/19/2024  Time:    15:47    Electronically signed by: Arjun Jones MD  Date:    04/19/2024  Time:    16:22     No results found for this or any previous visit (from the past 2160 hour(s)).  SURG FL Surgery Fluoro Usage  See OP Notes for results.     IMPRESSION: See OP Notes for results.     This procedure was auto-finalized by: Virtual Radiologist         ASSESSMENT     1. Hematuria, unspecified type    2. Erectile disorder    3. Flank pain, acute    4. Pneumaturia        PLAN:     Renal US, rule out hydro or stones  Outpatient  Cystoscopy  Rx Viagra  RTC 1-2 weeks  Bryant Gaines MD  Urology  [facility]     Disclaimer: This note has been generated using voice-recognition software. There may be typographical errors that have been missed during proof-reading.

## 2024-05-19 ENCOUNTER — PATIENT MESSAGE (OUTPATIENT)
Dept: PAIN MEDICINE | Facility: CLINIC | Age: 54
End: 2024-05-19
Payer: COMMERCIAL

## 2024-05-22 ENCOUNTER — TELEPHONE (OUTPATIENT)
Dept: UROLOGY | Facility: CLINIC | Age: 54
End: 2024-05-22
Payer: COMMERCIAL

## 2024-05-22 NOTE — TELEPHONE ENCOUNTER
J  Patient called in about worsening flank/back pain.  I put order in for renal Ultrasound,  Make f/u apt. In clinic after US  Plz let patient know  Chasidy urbina

## 2024-05-24 ENCOUNTER — OFFICE VISIT (OUTPATIENT)
Dept: UROLOGY | Facility: CLINIC | Age: 54
End: 2024-05-24
Attending: SPECIALIST
Payer: COMMERCIAL

## 2024-05-24 DIAGNOSIS — R31.9 HEMATURIA, UNSPECIFIED TYPE: ICD-10-CM

## 2024-05-24 DIAGNOSIS — R39.89 PNEUMATURIA: Primary | ICD-10-CM

## 2024-05-24 PROCEDURE — 4010F ACE/ARB THERAPY RXD/TAKEN: CPT | Mod: CPTII,S$GLB,, | Performed by: SPECIALIST

## 2024-05-24 PROCEDURE — 52000 CYSTOURETHROSCOPY: CPT | Mod: S$GLB,,, | Performed by: SPECIALIST

## 2024-05-24 PROCEDURE — 99499 UNLISTED E&M SERVICE: CPT | Mod: S$GLB,,, | Performed by: SPECIALIST

## 2024-05-24 NOTE — PROGRESS NOTES
"[facility] - Urology   Clinic Note    SUBJECTIVE:     Chief Complaint   Patient presents with    Procedure     cysto       Referral from: Bryant Gaines MD.    History of Present Illness:  Michele Chi Jr. is a 53 y.o. male who presents to clinic for cystoscopy.      Past Medical History:   Diagnosis Date    Gastritis     GERD (gastroesophageal reflux disease)     Hypertension     Internal hemorrhoids     Restless leg syndrome        Current Outpatient Medications on File Prior to Visit   Medication Sig Dispense Refill    DULoxetine (CYMBALTA) 20 MG capsule Take 1 capsule (20 mg total) by mouth once daily. (Patient not taking: Reported on 5/17/2024) 30 capsule 1    meclizine (ANTIVERT) 25 mg tablet Take 1 tablet (25 mg total) by mouth 3 (three) times daily as needed for Dizziness. (Patient not taking: Reported on 5/17/2024) 60 tablet 2    ondansetron (ZOFRAN-ODT) 4 MG TbDL Take 1 tablet (4 mg total) by mouth every 6 (six) hours as needed (nausea and vomiting). (Patient not taking: Reported on 5/17/2024) 27 tablet 0    sildenafiL (VIAGRA) 100 MG tablet Take 1 tablet (100 mg total) by mouth daily as needed for Erectile Dysfunction. 12 tablet 11     Current Facility-Administered Medications on File Prior to Visit   Medication Dose Route Frequency Provider Last Rate Last Admin    0.9%  NaCl infusion   Intravenous Continuous Yuriy Wilburn MD   Stopped at 05/14/21 1047    0.9%  NaCl infusion   Intravenous Continuous Yuriy Wilburn MD   Stopped at 08/30/23 1145         OBJECTIVE:     Estimated body mass index is 33.91 kg/m² as calculated from the following:    Height as of 5/17/24: 6' 1" (1.854 m).    Weight as of 5/17/24: 116.6 kg (257 lb 0.9 oz).    Vital Signs (Most Recent)  There were no vitals filed for this visit.    Physical Exam:    Physical Exam   Genitourinary Exam:  High-riding prostate difficult to palpate, no nodules      LABS:     Lab Results   Component Value Date    BUN " "12 01/04/2024    CREATININE 1.11 01/04/2024    WBC 10.40 01/04/2024    HGB 13.9 (L) 01/04/2024    HCT 43.2 01/04/2024     01/04/2024    AST 34 01/04/2024    ALT 36 01/04/2024    ALKPHOS 92 01/04/2024    ALBUMIN 4.4 01/04/2024    INR 1.0 08/04/2023        Urinalysis:   No results found for: "UAREFLEX"     PSA:  Lab Results   Component Value Date    PSADIAG 1.75 12/20/2023    PSADIAG 0.96 03/29/2021    PSADIAG 2.02 01/25/2018       Testosterone:  No results found for: "TOTALTESTOST", "TESTOSTERONE"     Imaging:  I have personally reviewed all relevant imaging studies.    Results for orders placed or performed during the hospital encounter of 04/19/24 (from the past 2160 hour(s))   CT Cervical Spine Without Contrast    Narrative    EXAMINATION:  CT CERVICAL SPINE WITHOUT CONTRAST    CLINICAL HISTORY:  Spinal stenosis, cervical;Spinal stenosis, cervical region    TECHNIQUE:  Low dose axial images, sagittal and coronal reformations were performed though the cervical spine.  Contrast was not administered.    COMPARISON:  Cervical spine radiograph 04/08/2024, MRI cervical spine 02/15/2024    FINDINGS:  Postoperative changes of C4-C7 ACDF with partial osseous fusion across the disc spaces.  No perihardware lucency or fracture.  Alignment anatomic.  No prevertebral soft tissue swelling.    Alignment: Straightening of the normal cervical lordosis.    Vertebrae: No fracture.  No lytic or blastic lesion.    C1-2: Dens is intact.  Pre-dens space is maintained.    Skull base and craniocervical junction: Normal.    Degenerative findings:    There is mild disc height loss/degenerative disc disease with uncovertebral spurring at C3-4 and C7-T1.  There is prominent left-sided facet arthropathy at C2-3, noting joint hypertrophy with subchondral sclerosis/cystic change.  Posterior disc osteophyte complex noted at C3-4.  These findings contribute to mild/moderate spinal canal and neural foraminal stenosis at C3-4, correlating " with the 02/15/2024 MRI exam.    C4-C7 is fused, as above.    Paraspinal muscles & soft tissues: Unremarkable.      Impression    C4-C7 ACDF, as above.  No acute findings.    Cervical spondylosis, as above.    Electronically signed by resident: Charisse Campa  Date:    04/19/2024  Time:    15:47    Electronically signed by: Arjun Jones MD  Date:    04/19/2024  Time:    16:22     No results found for this or any previous visit (from the past 2160 hour(s)).  SURG FL Surgery Fluoro Usage  See OP Notes for results.     IMPRESSION: See OP Notes for results.     This procedure was auto-finalized by: Virtual Radiologist     Procedure:     Patient was placed on the examination table in the supine position.  Written informed consent obtained.  Genitalia was prepped and draped in usual sterile fashion.  Lidocaine jelly was used to anesthetize the urethra.  Flexible cystourethroscopy was performed.  No evidence of a urethral stricture.  Prostate was unremarkable, hyperplasia absent.  Median lobe absent.  Upon entering the bladder there were no papillary bladder tumors stones areas of erythema or any fecal material.  Impression unremarkable exam  ASSESSMENT     1. Pneumaturia    2. Hematuria, unspecified type        PLAN:     Unremarkable cystoscopic evaluation.  Consider General surgery consult if symptoms persist.  Follow up in clinic in 1 month.    Bryant Gaines MD  Urology  [facility]     Disclaimer: This note has been generated using voice-recognition software. There may be typographical errors that have been missed during proof-reading.

## 2024-12-07 PROBLEM — K56.609 SBO (SMALL BOWEL OBSTRUCTION): Status: ACTIVE | Noted: 2024-12-07

## 2024-12-09 PROBLEM — E63.9 INADEQUATE DIETARY ENERGY INTAKE: Status: ACTIVE | Noted: 2024-12-09

## 2024-12-16 PROBLEM — I10 HYPERTENSION: Chronic | Status: ACTIVE | Noted: 2024-01-04

## 2024-12-16 PROBLEM — R10.13 ABDOMINAL PAIN, EPIGASTRIC: Status: ACTIVE | Noted: 2024-12-16

## 2024-12-23 ENCOUNTER — TELEPHONE (OUTPATIENT)
Dept: PAIN MEDICINE | Facility: CLINIC | Age: 54
End: 2024-12-23
Payer: COMMERCIAL

## 2024-12-23 NOTE — TELEPHONE ENCOUNTER
----- Message from Anum sent at 12/23/2024  2:34 PM CST -----  Type:  Sooner Apoointment Request    Caller is requesting a sooner appointment.  Caller declined first available appointment listed below.  Caller will not accept being placed on the waitlist and is requesting a message be sent to doctor.  Name of Caller: Pt   When is the first available appointment? N/A  Symptoms: injection for disc above neck   Would the patient rather a call back or a response via [facility]? Call   Best Call Back Number:[phone]   Additional Information:

## 2025-04-01 ENCOUNTER — OFFICE VISIT (OUTPATIENT)
Dept: UROLOGY | Facility: CLINIC | Age: 55
End: 2025-04-01
Attending: SPECIALIST
Payer: COMMERCIAL

## 2025-04-01 DIAGNOSIS — N41.0 ACUTE PROSTATITIS: Primary | ICD-10-CM

## 2025-04-01 DIAGNOSIS — R36.1 HEMATOSPERMIA: ICD-10-CM

## 2025-04-01 PROCEDURE — 99214 OFFICE O/P EST MOD 30 MIN: CPT | Mod: S$GLB,,, | Performed by: SPECIALIST

## 2025-04-01 RX ORDER — CIPROFLOXACIN 500 MG/1
500 TABLET ORAL EVERY 12 HOURS
Qty: 8 TABLET | Refills: 0 | Status: SHIPPED | OUTPATIENT
Start: 2025-04-01

## 2025-04-01 RX ORDER — TAMSULOSIN HYDROCHLORIDE 0.4 MG/1
0.4 CAPSULE ORAL DAILY
Qty: 30 CAPSULE | Refills: 11 | Status: SHIPPED | OUTPATIENT
Start: 2025-04-01 | End: 2026-04-01

## 2025-04-01 NOTE — PROGRESS NOTES
"[facility] - Urology   Clinic Note    SUBJECTIVE:     Chief Complaint   Patient presents with    Follow-up       Referral from: No ref. provider found.    History of Present Illness:  Michele Chi Jr. is a 54 y.o. male who presents to clinic for hematospermia.    Patient likely experiencing prostatitis.  Denies dysuria, hematuria, f/c.  He was empirically placed on Bactrim but he does not think this has been effective.  He's not on an alpha blocker.  He admits his sx are exacerbated by diet.  He wishes to curtail his coffee consumption.  He reports a change in the quality of his stream, he is not on an alpha blocker.    Past Medical History:   Diagnosis Date    Gastritis     GERD (gastroesophageal reflux disease)     Hypertension     Internal hemorrhoids     Restless leg syndrome        Medications Ordered Prior to Encounter[1]      OBJECTIVE:     Estimated body mass index is 32.72 kg/m² as calculated from the following:    Height as of 3/24/25: 6' 1" (1.854 m).    Weight as of 3/24/25: 112.5 kg (248 lb).    Vital Signs (Most Recent)  There were no vitals filed for this visit.    Physical Exam:    Physical Exam     GENERAL: patient sitting comfortably  HEENT: normocephalic  NECK: supple, no JVD  PULM: normal chest rise, no increased WOB  HEART: non-diaphoretic  SKIN: warm, dry, well perfused  EXT: no bruising or edema  NEURO: grossly normal with no focal deficits  PSYCH: appropriate mood and affect    Genitourinary Exam:  deferred      LABS:     Lab Results   Component Value Date    BUN 5 (L) 12/17/2024    CREATININE 0.94 12/17/2024    WBC 9.00 12/17/2024    HGB 10.2 (L) 12/17/2024    HCT 32.5 (L) 12/17/2024     12/17/2024    AST 19 12/17/2024    ALT 14 12/17/2024    ALKPHOS 73 12/17/2024    ALBUMIN 3.2 (L) 12/17/2024    HGBA1C 6.2 (H) 08/07/2024    INR 1.0 08/04/2023        Urinalysis:   No results found for: "UAREFLEX"     PSA:  Lab Results   Component Value Date    PSADIAG 1.75 " "12/20/2023    PSADIAG 0.96 03/29/2021    PSADIAG 2.02 01/25/2018       Testosterone:  No results found for: "TOTALTESTOST", "TESTOSTERONE"     Imaging:  I have personally reviewed all relevant imaging studies.    Results for orders placed or performed during the hospital encounter of 03/24/25 (from the past 2160 hours)   CT Cervical Spine Without Contrast    Narrative    EXAMINATION:  CT CERVICAL SPINE WITHOUT CONTRAST    CLINICAL HISTORY:  CervicalgiaPAIN;    TECHNIQUE:  Axial CT images were obtained. Iterative reconstruction technique was used.  CT/cardiac nuclear exam/s in prior 12 months: 3.    COMPARISON:  CT cervical spine without IV contrast 11/12/2024.    FINDINGS:  At C2-3, a dorsal disc protrusion with mild effacement of thecal sac.  No cord impingement.  No foraminal stenosis.    At C3-4, discectomy with anterior fusion.  No cord impingement.  Moderate left foraminal stenosis secondary to uncinate hypertrophy and facet arthrosis.  Bilateral facet shims.    At C4-5, discectomy with anterior fusion.  No cord impingement.  Moderate right foraminal stenosis secondary to uncinate hypertrophy and facet arthrosis.  Bilateral facet shims.    At C5-6, discectomy with anterior fusion.  No cord impingement.  No foraminal stenosis. Bilateral facet shims.    At C6-7, discectomy with anterior fusion.  No cord impingement.  Moderate left foraminal stenosis secondary to uncinate hypertrophy and facet arthrosis.  Mild right foraminal stenosis secondary to uncinate hypertrophy. Bilateral facet shims.    At C7-T1, no disc protrusion, cord impingement or foraminal stenosis.    Multilevel spondylosis and facet arthrosis.    The paravertebral soft tissues appear unremarkable.      Impression    Multilevel disc disease with multilevel discectomy and fusion including facet shims.  Multilevel foraminal stenosis.      Electronically signed by: Arron Maciel MD  Date:    03/24/2025  Time:    12:27     No results found for this " or any previous visit (from the past 2160 hours).  FL Myelogram Cervical, COMPLETE With CT to Follow  Narrative: EXAMINATION:  FL MYELOGRAM CERVICAL WITH CT TO FOLLOW    CLINICAL HISTORY:  Cervicalgia    COMPARISON:  None    FINDINGS:  After obtaining informed consent, cervical myelogram was obtained using standard technique and procedure.  8 mL of Isovue 300 was injected into the spinal canal at L3-4.  Patient tolerated procedure well and there were no immediate complications.    Fluoroscopy time 3.3 minutes, 4 static images obtained.  Impression: Cervical myelogram for CT myelogram.    Electronically signed by: Arron Maciel MD  Date:    03/24/2025  Time:    12:37  CT Cervical Spine Without Contrast  Narrative: EXAMINATION:  CT CERVICAL SPINE WITHOUT CONTRAST    CLINICAL HISTORY:  CervicalgiaPAIN;    TECHNIQUE:  Axial CT images were obtained. Iterative reconstruction technique was used.  CT/cardiac nuclear exam/s in prior 12 months: 3.    COMPARISON:  CT cervical spine without IV contrast 11/12/2024.    FINDINGS:  At C2-3, a dorsal disc protrusion with mild effacement of thecal sac.  No cord impingement.  No foraminal stenosis.    At C3-4, discectomy with anterior fusion.  No cord impingement.  Moderate left foraminal stenosis secondary to uncinate hypertrophy and facet arthrosis.  Bilateral facet shims.    At C4-5, discectomy with anterior fusion.  No cord impingement.  Moderate right foraminal stenosis secondary to uncinate hypertrophy and facet arthrosis.  Bilateral facet shims.    At C5-6, discectomy with anterior fusion.  No cord impingement.  No foraminal stenosis. Bilateral facet shims.    At C6-7, discectomy with anterior fusion.  No cord impingement.  Moderate left foraminal stenosis secondary to uncinate hypertrophy and facet arthrosis.  Mild right foraminal stenosis secondary to uncinate hypertrophy. Bilateral facet shims.    At C7-T1, no disc protrusion, cord impingement or foraminal  stenosis.    Multilevel spondylosis and facet arthrosis.    The paravertebral soft tissues appear unremarkable.  Impression: Multilevel disc disease with multilevel discectomy and fusion including facet shims.  Multilevel foraminal stenosis.    Electronically signed by: Arron Maciel MD  Date:    03/24/2025  Time:    12:27         ASSESSMENT     1. Acute prostatitis    2. Hematospermia        PLAN:     Hold Bactrim  Rx Ciprofloxacin  Rx Tamsulosin  Avoid caffenated beverages, spicy foods, increase p.o. fluid intake  PSA 4-6 weeks after symptoms subside  RTC 2-3 weeks    Bryant Gaines MD  Urology  [facility]     Disclaimer: This note has been generated using voice-recognition software. There may be typographical errors that have been missed during proof-reading. Which were         [1]   Current Outpatient Medications on File Prior to Visit   Medication Sig Dispense Refill    HYDROcodone-acetaminophen (NORCO) 5-325 mg per tablet Take 1 tablet by mouth every 6 (six) hours as needed for Pain. 10 tablet 0    losartan (COZAAR) 50 MG tablet Take 50 mg by mouth once daily.      metoprolol tartrate (LOPRESSOR) 25 MG tablet Take 25 mg by mouth Daily.      sildenafiL (VIAGRA) 100 MG tablet Take 1 tablet (100 mg total) by mouth daily as needed for Erectile Dysfunction. 12 tablet 11    ferrous sulfate (FEOSOL) 325 mg (65 mg iron) Tab tablet Take 1 tablet (325 mg total) by mouth every other day. 30 tablet 0    pantoprazole (PROTONIX) 40 MG tablet Take 1 tablet (40 mg total) by mouth once daily. 30 tablet 0    simethicone 125 mg Tab Take 125 mg by mouth 4 (four) times daily as needed (gas pain, bloating). 40 tablet 0     Current Facility-Administered Medications on File Prior to Visit   Medication Dose Route Frequency Provider Last Rate Last Admin    0.9%  NaCl infusion   Intravenous Continuous Yuriy Wilburn MD   Stopped at 05/14/21 1047    0.9%  NaCl infusion   Intravenous Continuous Yuriy Wilburn,  MD   Stopped at 08/30/23 2099

## 2025-04-15 ENCOUNTER — OFFICE VISIT (OUTPATIENT)
Dept: UROLOGY | Facility: CLINIC | Age: 55
End: 2025-04-15
Attending: SPECIALIST
Payer: COMMERCIAL

## 2025-04-15 DIAGNOSIS — R36.1 HEMATOSPERMIA: ICD-10-CM

## 2025-04-15 DIAGNOSIS — N41.0 ACUTE PROSTATITIS: Primary | ICD-10-CM

## 2025-04-15 PROCEDURE — 99999 PR PBB SHADOW E&M-EST. PATIENT-LVL I: CPT | Mod: PBBFAC,,, | Performed by: SPECIALIST

## 2025-04-15 PROCEDURE — 99214 OFFICE O/P EST MOD 30 MIN: CPT | Mod: S$GLB,,, | Performed by: SPECIALIST

## 2025-04-15 RX ORDER — TAMSULOSIN HYDROCHLORIDE 0.4 MG/1
0.4 CAPSULE ORAL DAILY
Qty: 30 CAPSULE | Refills: 11 | Status: SHIPPED | OUTPATIENT
Start: 2025-04-15 | End: 2026-04-15

## 2025-04-15 RX ORDER — CIPROFLOXACIN 500 MG/1
500 TABLET ORAL EVERY 12 HOURS
Qty: 28 TABLET | Refills: 0 | Status: SHIPPED | OUTPATIENT
Start: 2025-04-15 | End: 2025-04-29

## 2025-04-15 NOTE — PROGRESS NOTES
"  [facility] - Urology   Clinic Note    SUBJECTIVE:     Chief Complaint   Patient presents with    Follow-up       Referral from: No ref. provider found.    History of Present Illness:  Michele Chi Jr. is a 54 y.o. male who presents to clinic for hematospermia.    Hematospermia has not resolved.  He has not tried tamsulosin.  Reports mild LUTS.  Denies hematuria, f/c.  No recent PSA.  Past Medical History:   Diagnosis Date    Gastritis     GERD (gastroesophageal reflux disease)     Hypertension     Internal hemorrhoids     Restless leg syndrome        Medications Ordered Prior to Encounter[1]      OBJECTIVE:     Estimated body mass index is 32.72 kg/m² as calculated from the following:    Height as of 3/24/25: 6' 1" (1.854 m).    Weight as of 3/24/25: 112.5 kg (248 lb).    Vital Signs (Most Recent)  There were no vitals filed for this visit.    Physical Exam:    Physical Exam     GENERAL: patient sitting comfortably  HEENT: normocephalic  NECK: supple, no JVD  PULM: normal chest rise, no increased WOB  HEART: non-diaphoretic  EXT: no bruising or edema  NEURO: grossly normal with no focal deficits  PSYCH: appropriate mood and affect    Genitourinary Exam:  deferred      LABS:     Lab Results   Component Value Date    BUN 5 (L) 12/17/2024    CREATININE 0.94 12/17/2024    WBC 9.00 12/17/2024    HGB 10.2 (L) 12/17/2024    HCT 32.5 (L) 12/17/2024     12/17/2024    AST 19 12/17/2024    ALT 14 12/17/2024    ALKPHOS 73 12/17/2024    ALBUMIN 3.2 (L) 12/17/2024    HGBA1C 6.2 (H) 08/07/2024    INR 1.0 08/04/2023        Urinalysis:   No results found for: "UAREFLEX"     PSA:  Lab Results   Component Value Date    PSADIAG 1.75 12/20/2023    PSADIAG 0.96 03/29/2021    PSADIAG 2.02 01/25/2018       Testosterone:  No results found for: "TOTALTESTOST", "TESTOSTERONE"     Imaging:  I have personally reviewed all relevant imaging studies.    Results for orders placed or performed during the hospital " encounter of 03/24/25 (from the past 2160 hours)   CT Cervical Spine Without Contrast    Narrative    EXAMINATION:  CT CERVICAL SPINE WITHOUT CONTRAST    CLINICAL HISTORY:  CervicalgiaPAIN;    TECHNIQUE:  Axial CT images were obtained. Iterative reconstruction technique was used.  CT/cardiac nuclear exam/s in prior 12 months: 3.    COMPARISON:  CT cervical spine without IV contrast 11/12/2024.    FINDINGS:  At C2-3, a dorsal disc protrusion with mild effacement of thecal sac.  No cord impingement.  No foraminal stenosis.    At C3-4, discectomy with anterior fusion.  No cord impingement.  Moderate left foraminal stenosis secondary to uncinate hypertrophy and facet arthrosis.  Bilateral facet shims.    At C4-5, discectomy with anterior fusion.  No cord impingement.  Moderate right foraminal stenosis secondary to uncinate hypertrophy and facet arthrosis.  Bilateral facet shims.    At C5-6, discectomy with anterior fusion.  No cord impingement.  No foraminal stenosis. Bilateral facet shims.    At C6-7, discectomy with anterior fusion.  No cord impingement.  Moderate left foraminal stenosis secondary to uncinate hypertrophy and facet arthrosis.  Mild right foraminal stenosis secondary to uncinate hypertrophy. Bilateral facet shims.    At C7-T1, no disc protrusion, cord impingement or foraminal stenosis.    Multilevel spondylosis and facet arthrosis.    The paravertebral soft tissues appear unremarkable.      Impression    Multilevel disc disease with multilevel discectomy and fusion including facet shims.  Multilevel foraminal stenosis.      Electronically signed by: Arron Maciel MD  Date:    03/24/2025  Time:    12:27     No results found for this or any previous visit (from the past 2160 hours).  FL Myelogram Cervical, COMPLETE With CT to Follow  Narrative: EXAMINATION:  FL MYELOGRAM CERVICAL WITH CT TO FOLLOW    CLINICAL HISTORY:  Cervicalgia    COMPARISON:  None    FINDINGS:  After obtaining informed consent,  cervical myelogram was obtained using standard technique and procedure.  8 mL of Isovue 300 was injected into the spinal canal at L3-4.  Patient tolerated procedure well and there were no immediate complications.    Fluoroscopy time 3.3 minutes, 4 static images obtained.  Impression: Cervical myelogram for CT myelogram.    Electronically signed by: Arron Maciel MD  Date:    03/24/2025  Time:    12:37  CT Cervical Spine Without Contrast  Narrative: EXAMINATION:  CT CERVICAL SPINE WITHOUT CONTRAST    CLINICAL HISTORY:  CervicalgiaPAIN;    TECHNIQUE:  Axial CT images were obtained. Iterative reconstruction technique was used.  CT/cardiac nuclear exam/s in prior 12 months: 3.    COMPARISON:  CT cervical spine without IV contrast 11/12/2024.    FINDINGS:  At C2-3, a dorsal disc protrusion with mild effacement of thecal sac.  No cord impingement.  No foraminal stenosis.    At C3-4, discectomy with anterior fusion.  No cord impingement.  Moderate left foraminal stenosis secondary to uncinate hypertrophy and facet arthrosis.  Bilateral facet shims.    At C4-5, discectomy with anterior fusion.  No cord impingement.  Moderate right foraminal stenosis secondary to uncinate hypertrophy and facet arthrosis.  Bilateral facet shims.    At C5-6, discectomy with anterior fusion.  No cord impingement.  No foraminal stenosis. Bilateral facet shims.    At C6-7, discectomy with anterior fusion.  No cord impingement.  Moderate left foraminal stenosis secondary to uncinate hypertrophy and facet arthrosis.  Mild right foraminal stenosis secondary to uncinate hypertrophy. Bilateral facet shims.    At C7-T1, no disc protrusion, cord impingement or foraminal stenosis.    Multilevel spondylosis and facet arthrosis.    The paravertebral soft tissues appear unremarkable.  Impression: Multilevel disc disease with multilevel discectomy and fusion including facet shims.  Multilevel foraminal stenosis.    Electronically signed by: Arron  MD Raheem  Date:    03/24/2025  Time:    12:27         ASSESSMENT     1. Acute prostatitis    2. Hematospermia        PLAN:     Rx Flomax  NSAIDS PRN  Rx Cipro x 2 wks  PSA 4 weeks  RTC one month    Bryant Gaines MD  Urology  [facility]     Disclaimer: This note has been generated using voice-recognition software. There may be typographical errors that have been missed during proof-reading.        [1]   Current Outpatient Medications on File Prior to Visit   Medication Sig Dispense Refill    ciprofloxacin HCl (CIPRO) 500 MG tablet Take 1 tablet (500 mg total) by mouth every 12 (twelve) hours. 8 tablet 0    ferrous sulfate (FEOSOL) 325 mg (65 mg iron) Tab tablet Take 1 tablet (325 mg total) by mouth every other day. 30 tablet 0    HYDROcodone-acetaminophen (NORCO) 5-325 mg per tablet Take 1 tablet by mouth every 6 (six) hours as needed for Pain. 10 tablet 0    losartan (COZAAR) 50 MG tablet Take 50 mg by mouth once daily.      metoprolol tartrate (LOPRESSOR) 25 MG tablet Take 25 mg by mouth Daily.      pantoprazole (PROTONIX) 40 MG tablet Take 1 tablet (40 mg total) by mouth once daily. 30 tablet 0    sildenafiL (VIAGRA) 100 MG tablet Take 1 tablet (100 mg total) by mouth daily as needed for Erectile Dysfunction. 12 tablet 11    simethicone 125 mg Tab Take 125 mg by mouth 4 (four) times daily as needed (gas pain, bloating). 40 tablet 0    tamsulosin (FLOMAX) 0.4 mg Cap Take 1 capsule (0.4 mg total) by mouth once daily. 30 capsule 11     Current Facility-Administered Medications on File Prior to Visit   Medication Dose Route Frequency Provider Last Rate Last Admin    0.9%  NaCl infusion   Intravenous Continuous Yuriy Wilburn MD   Stopped at 05/14/21 1047    0.9%  NaCl infusion   Intravenous Continuous Yuriy Wilburn MD   Stopped at 08/30/23 5494

## 2025-05-16 ENCOUNTER — LAB VISIT (OUTPATIENT)
Dept: LAB | Facility: HOSPITAL | Age: 55
End: 2025-05-16
Attending: SPECIALIST
Payer: COMMERCIAL

## 2025-05-16 ENCOUNTER — OFFICE VISIT (OUTPATIENT)
Dept: UROLOGY | Facility: CLINIC | Age: 55
End: 2025-05-16
Attending: SPECIALIST
Payer: COMMERCIAL

## 2025-05-16 VITALS
BODY MASS INDEX: 33.9 KG/M2 | OXYGEN SATURATION: 95 % | HEIGHT: 73 IN | RESPIRATION RATE: 18 BRPM | HEART RATE: 85 BPM | WEIGHT: 255.75 LBS

## 2025-05-16 DIAGNOSIS — R36.1 HEMATOSPERMIA: ICD-10-CM

## 2025-05-16 DIAGNOSIS — R36.1 HEMATOSPERMIA: Primary | ICD-10-CM

## 2025-05-16 LAB — PSA SERPL-MCNC: 1.47 NG/ML

## 2025-05-16 PROCEDURE — 84153 ASSAY OF PSA TOTAL: CPT

## 2025-05-16 PROCEDURE — 36415 COLL VENOUS BLD VENIPUNCTURE: CPT

## 2025-05-16 PROCEDURE — 99999 PR PBB SHADOW E&M-EST. PATIENT-LVL III: CPT | Mod: PBBFAC,,, | Performed by: SPECIALIST

## 2025-05-16 NOTE — PROGRESS NOTES
"[facility] - Urology   Clinic Note    SUBJECTIVE:     Chief Complaint   Patient presents with    Follow-up       Referral from: No ref. provider found.    History of Present Illness:  Michele Chi Jr. is a 54 y.o. male who presents to clinic for hematospermia.    Hematospermia resolved.  Does complain of retrograde ejaculation with tamsulosin. Denies dizziness.  He menions he has been on Bactrim (for prophylaxis from recent spine surgery).    Past Medical History:   Diagnosis Date    Gastritis     GERD (gastroesophageal reflux disease)     Hypertension     Internal hemorrhoids     Restless leg syndrome        Medications Ordered Prior to Encounter[1]      OBJECTIVE:     Estimated body mass index is 33.74 kg/m² as calculated from the following:    Height as of this encounter: 6' 1" (1.854 m).    Weight as of this encounter: 116 kg (255 lb 11.7 oz).    Vital Signs (Most Recent)  Vitals:    05/16/25 0853   Pulse: 85   Resp: 18       Physical Exam:    Physical Exam     GENERAL: patient sitting comfortably  PSYCH: appropriate mood and affect    LABS:     Lab Results   Component Value Date    BUN 13 05/09/2025    CREATININE 0.96 05/09/2025    WBC 17.80 (H) 05/09/2025    HGB 13.5 (L) 05/09/2025    HCT 42.0 05/09/2025     05/09/2025    AST 19 12/17/2024    ALT 14 12/17/2024    ALKPHOS 73 12/17/2024    ALBUMIN 3.2 (L) 12/17/2024    HGBA1C 6.2 (H) 08/07/2024    INR 1.0 08/04/2023        Urinalysis:   No results found for: "UAREFLEX"     PSA:  Lab Results   Component Value Date    PSADIAG 1.75 12/20/2023    PSADIAG 0.96 03/29/2021    PSADIAG 2.02 01/25/2018       Testosterone:  No results found for: "TOTALTESTOST", "TESTOSTERONE"     Imaging:  I have personally reviewed all relevant imaging studies.    Results for orders placed or performed during the hospital encounter of 03/24/25 (from the past 2160 hours)   CT Cervical Spine Without Contrast    Narrative    EXAMINATION:  CT CERVICAL SPINE WITHOUT " CONTRAST    CLINICAL HISTORY:  CervicalgiaPAIN;    TECHNIQUE:  Axial CT images were obtained. Iterative reconstruction technique was used.  CT/cardiac nuclear exam/s in prior 12 months: 3.    COMPARISON:  CT cervical spine without IV contrast 11/12/2024.    FINDINGS:  At C2-3, a dorsal disc protrusion with mild effacement of thecal sac.  No cord impingement.  No foraminal stenosis.    At C3-4, discectomy with anterior fusion.  No cord impingement.  Moderate left foraminal stenosis secondary to uncinate hypertrophy and facet arthrosis.  Bilateral facet shims.    At C4-5, discectomy with anterior fusion.  No cord impingement.  Moderate right foraminal stenosis secondary to uncinate hypertrophy and facet arthrosis.  Bilateral facet shims.    At C5-6, discectomy with anterior fusion.  No cord impingement.  No foraminal stenosis. Bilateral facet shims.    At C6-7, discectomy with anterior fusion.  No cord impingement.  Moderate left foraminal stenosis secondary to uncinate hypertrophy and facet arthrosis.  Mild right foraminal stenosis secondary to uncinate hypertrophy. Bilateral facet shims.    At C7-T1, no disc protrusion, cord impingement or foraminal stenosis.    Multilevel spondylosis and facet arthrosis.    The paravertebral soft tissues appear unremarkable.      Impression    Multilevel disc disease with multilevel discectomy and fusion including facet shims.  Multilevel foraminal stenosis.      Electronically signed by: Arron Maciel MD  Date:    03/24/2025  Time:    12:27     No results found for this or any previous visit (from the past 2160 hours).  FL Myelogram Cervical, COMPLETE With CT to Follow  Narrative: EXAMINATION:  FL MYELOGRAM CERVICAL WITH CT TO FOLLOW    CLINICAL HISTORY:  Cervicalgia    COMPARISON:  None    FINDINGS:  After obtaining informed consent, cervical myelogram was obtained using standard technique and procedure.  8 mL of Isovue 300 was injected into the spinal canal at L3-4.  Patient  tolerated procedure well and there were no immediate complications.    Fluoroscopy time 3.3 minutes, 4 static images obtained.  Impression: Cervical myelogram for CT myelogram.    Electronically signed by: Arron Maciel MD  Date:    03/24/2025  Time:    12:37  CT Cervical Spine Without Contrast  Narrative: EXAMINATION:  CT CERVICAL SPINE WITHOUT CONTRAST    CLINICAL HISTORY:  CervicalgiaPAIN;    TECHNIQUE:  Axial CT images were obtained. Iterative reconstruction technique was used.  CT/cardiac nuclear exam/s in prior 12 months: 3.    COMPARISON:  CT cervical spine without IV contrast 11/12/2024.    FINDINGS:  At C2-3, a dorsal disc protrusion with mild effacement of thecal sac.  No cord impingement.  No foraminal stenosis.    At C3-4, discectomy with anterior fusion.  No cord impingement.  Moderate left foraminal stenosis secondary to uncinate hypertrophy and facet arthrosis.  Bilateral facet shims.    At C4-5, discectomy with anterior fusion.  No cord impingement.  Moderate right foraminal stenosis secondary to uncinate hypertrophy and facet arthrosis.  Bilateral facet shims.    At C5-6, discectomy with anterior fusion.  No cord impingement.  No foraminal stenosis. Bilateral facet shims.    At C6-7, discectomy with anterior fusion.  No cord impingement.  Moderate left foraminal stenosis secondary to uncinate hypertrophy and facet arthrosis.  Mild right foraminal stenosis secondary to uncinate hypertrophy. Bilateral facet shims.    At C7-T1, no disc protrusion, cord impingement or foraminal stenosis.    Multilevel spondylosis and facet arthrosis.    The paravertebral soft tissues appear unremarkable.  Impression: Multilevel disc disease with multilevel discectomy and fusion including facet shims.  Multilevel foraminal stenosis.    Electronically signed by: Arron Maciel MD  Date:    03/24/2025  Time:    12:27         ASSESSMENT     1. Hematospermia        PLAN:     Check PSA, touch base by phone with  results  RTC 3 months.    Bryant Gaines MD  Urology  [facility]     Disclaimer: This note has been generated using voice-recognition software. There may be typographical errors that have been missed during proof-reading.          [1]   Current Outpatient Medications on File Prior to Visit   Medication Sig Dispense Refill    ciprofloxacin HCl (CIPRO) 500 MG tablet Take 1 tablet (500 mg total) by mouth every 12 (twelve) hours. 8 tablet 0    HYDROcodone-acetaminophen (NORCO) 5-325 mg per tablet Take 1 tablet by mouth every 6 (six) hours as needed for Pain. 10 tablet 0    losartan (COZAAR) 50 MG tablet Take 50 mg by mouth once daily.      metoprolol tartrate (LOPRESSOR) 25 MG tablet Take 25 mg by mouth Daily.      sildenafiL (VIAGRA) 100 MG tablet Take 1 tablet (100 mg total) by mouth daily as needed for Erectile Dysfunction. 12 tablet 11    simethicone 125 mg Tab Take 125 mg by mouth 4 (four) times daily as needed (gas pain, bloating). 40 tablet 0    tamsulosin (FLOMAX) 0.4 mg Cap Take 1 capsule (0.4 mg total) by mouth once daily. 30 capsule 11    tamsulosin (FLOMAX) 0.4 mg Cap Take 1 capsule (0.4 mg total) by mouth once daily. 30 capsule 11    pantoprazole (PROTONIX) 40 MG tablet Take 1 tablet (40 mg total) by mouth once daily. 30 tablet 0     Current Facility-Administered Medications on File Prior to Visit   Medication Dose Route Frequency Provider Last Rate Last Admin    0.9%  NaCl infusion   Intravenous Continuous Yuriy Wilburn MD   Stopped at 05/14/21 1047    0.9%  NaCl infusion   Intravenous Continuous Yuriy Wilburn MD   Stopped at 08/30/23 1141

## 2025-08-19 ENCOUNTER — OFFICE VISIT (OUTPATIENT)
Dept: UROLOGY | Facility: CLINIC | Age: 55
End: 2025-08-19
Attending: SPECIALIST
Payer: COMMERCIAL

## 2025-08-19 VITALS
HEART RATE: 61 BPM | OXYGEN SATURATION: 96 % | DIASTOLIC BLOOD PRESSURE: 88 MMHG | HEIGHT: 73 IN | BODY MASS INDEX: 33.69 KG/M2 | SYSTOLIC BLOOD PRESSURE: 148 MMHG | WEIGHT: 254.19 LBS

## 2025-08-19 DIAGNOSIS — R36.1 HEMATOSPERMIA: ICD-10-CM

## 2025-08-19 DIAGNOSIS — N52.9 ERECTILE DISORDER: Primary | ICD-10-CM

## 2025-08-19 PROCEDURE — 99999 PR PBB SHADOW E&M-EST. PATIENT-LVL III: CPT | Mod: PBBFAC,,, | Performed by: SPECIALIST

## 2025-08-19 RX ORDER — TADALAFIL 20 MG/1
20 TABLET ORAL DAILY
Qty: 10 TABLET | Refills: 5 | Status: SHIPPED | OUTPATIENT
Start: 2025-08-19 | End: 2026-08-19

## (undated) DEVICE — PACK DRAPE UNIVERSAL CONVERTOR

## (undated) DEVICE — DRESSING TRANS 2X2 TEGADERM

## (undated) DEVICE — CLOSURE SKIN STERI STRIP 1/2X4

## (undated) DEVICE — GOWN POLY REINF BRTH SLV XL

## (undated) DEVICE — DRAPE STERI INSTRUMENT 1018

## (undated) DEVICE — SPONGE COTTON TRAY 4X4IN

## (undated) DEVICE — ELECTRODE REM PLYHSV RETURN 9

## (undated) DEVICE — ADHESIVE MASTISOL VIAL 48/BX

## (undated) DEVICE — SET TRI-LUMEN FILTERED TUBE

## (undated) DEVICE — NDL SPINAL 18GX3.5 SPINOCAN

## (undated) DEVICE — GLOVE BIOGEL SKINSENSE PI 7.5

## (undated) DEVICE — SYR ONLY LUER LOCK 20CC

## (undated) DEVICE — TAPE CURAD SILK ADH 3INX10YD

## (undated) DEVICE — SOL WATER STRL IRR 1000ML

## (undated) DEVICE — SYR SLIP TIP 20CC

## (undated) DEVICE — ELECTRODE BLADE INSULATED 1 IN

## (undated) DEVICE — SEAL UNIVERSAL 5MM-8MM XI

## (undated) DEVICE — DRAPE ARM DAVINCI XI

## (undated) DEVICE — COVER LIGHT HANDLE

## (undated) DEVICE — DRAPE ABDOMINAL TIBURON 14X11

## (undated) DEVICE — SOL NS 1000CC

## (undated) DEVICE — TUBING SUC UNIV W/CONN 12FT

## (undated) DEVICE — SPONGE GAUZE 16PLY 4X4

## (undated) DEVICE — TRAY MINOR GEN SURG OMC

## (undated) DEVICE — DRESSING TRANS 4X4 TEGADERM

## (undated) DEVICE — SUT ETHIBOND EXCEL 0 CT2 30

## (undated) DEVICE — DRAPE SCOPE PILLOW WARMER

## (undated) DEVICE — DRAPE INCISE IOBAN 2 23X17IN

## (undated) DEVICE — DRAPE COLUMN DAVINCI XI